# Patient Record
Sex: MALE | Race: WHITE | NOT HISPANIC OR LATINO | Employment: OTHER | URBAN - METROPOLITAN AREA
[De-identification: names, ages, dates, MRNs, and addresses within clinical notes are randomized per-mention and may not be internally consistent; named-entity substitution may affect disease eponyms.]

---

## 2020-03-16 ENCOUNTER — TELEPHONE (OUTPATIENT)
Dept: CARDIAC SURGERY | Facility: CLINIC | Age: 60
End: 2020-03-16

## 2020-03-16 NOTE — TELEPHONE ENCOUNTER
Called and spoke with patient concerning appointment 3/17/20. We have cancelled the appointment and will call him to reschedule. Dr Rodriguez has reviewed his recent testing report and feels this is acceptable. Patient is in agreement and I let him know we will call him in several weeks to reschedule

## 2020-04-22 ENCOUNTER — TELEPHONE (OUTPATIENT)
Dept: CARDIAC SURGERY | Facility: CLINIC | Age: 60
End: 2020-04-22

## 2020-04-22 NOTE — TELEPHONE ENCOUNTER
Spoke with patient letting him know the test he had completed does not provide imaging our providers can view. This was determined when I spoke with [facility] testing. Patient will discuss follow up options with Montserrat MCGEE at tomorrows video visit.

## 2020-04-23 ENCOUNTER — TELEPHONE (OUTPATIENT)
Dept: CARDIAC SURGERY | Facility: CLINIC | Age: 60
End: 2020-04-23

## 2020-04-23 ENCOUNTER — TELEMEDICINE (OUTPATIENT)
Dept: CARDIAC SURGERY | Facility: CLINIC | Age: 60
End: 2020-04-23

## 2020-04-23 VITALS
HEART RATE: 105 BPM | BODY MASS INDEX: 25.91 KG/M2 | HEIGHT: 68 IN | DIASTOLIC BLOOD PRESSURE: 84 MMHG | SYSTOLIC BLOOD PRESSURE: 122 MMHG | OXYGEN SATURATION: 98 % | TEMPERATURE: 97 F | WEIGHT: 171 LBS

## 2020-04-23 DIAGNOSIS — I77.810 THORACIC AORTIC ECTASIA (HCC): Primary | ICD-10-CM

## 2020-04-23 DIAGNOSIS — I10 ESSENTIAL HYPERTENSION: ICD-10-CM

## 2020-04-23 DIAGNOSIS — E78.2 MIXED HYPERLIPIDEMIA: ICD-10-CM

## 2020-04-23 DIAGNOSIS — Z72.0 TOBACCO ABUSE: ICD-10-CM

## 2020-04-23 PROBLEM — E11.9 DM TYPE 2 (DIABETES MELLITUS, TYPE 2): Status: ACTIVE | Noted: 2020-04-23

## 2020-04-23 PROBLEM — J44.9 COPD (CHRONIC OBSTRUCTIVE PULMONARY DISEASE): Status: ACTIVE | Noted: 2020-04-23

## 2020-04-23 PROCEDURE — 99215 OFFICE O/P EST HI 40 MIN: CPT | Performed by: NURSE PRACTITIONER

## 2020-04-23 RX ORDER — GABAPENTIN 100 MG/1
100 CAPSULE ORAL 2 TIMES DAILY
COMMUNITY

## 2020-04-23 RX ORDER — CYCLOBENZAPRINE HCL 5 MG
5 TABLET ORAL 3 TIMES DAILY PRN
COMMUNITY

## 2020-04-23 NOTE — TELEPHONE ENCOUNTER
After speaking with Montserrat MCGEE I called and made a new patient appointment for patient with Dr Short. 6/30/20 at 11am. This was agreeable to the patient. He has also been placed on the recall list for 6 months to follow up in our office with a repeat CTof the chest without contrast

## 2020-04-23 NOTE — PROGRESS NOTES
4/23/2020      Subjective:      Ino Maddox MD    Chief Complaint:  Ascending aortic ectasia    History of Present Illness:       Dear Ino Andrade MD and Colleagues,    It was nice to talk with Patel Nevarez in [facility] for evaluation and management of ascending aortic ectasia d/t video visit given COVID-19 pandemic precautions.  I was asked by Dr. Ibarra to see him today.  Heis a 59 y.o. male with a complex history of prior cardiac surgery on 2 occasions,  HTN, HLD, fatty liver disease, COPD/emphysema, continued tobacco abuse, fibromyalgia and type 2 diabetes.  He had ASD repair and mitral valve repair in 1972 and reoperative mitral valve surgery in 2016 at [facility] in [place].  He is mailing us the op note .  He is a retired nurse.  He also has a CT report from [place] after his cardiac surgery that reports his ascending aorta measurement at 3.75 cm.  The low dose screening CT of chest  images on 2/24/2020 were unable to be reviewed.  [facility] reports these images are not reproducible. This was d/w Dr. Ibarra and he said to proceed with the appt and evaluate what Mr. Nevarez needs.  The CT reports that his ascending aorta measures 4.3 cm.  He reports daily shortness of breath that worsens with exertion.  He is not on oxygen and attributes his SOA to emphysema.  He does use daily nebulizers at home.  He reports chest a couple times a month.  It is intermittent and is relieved with one SL NTG.  He has chronic low back pain.  He denies any numbness/tingling/pain of extremities.  No vascular deficiencies or hyperextensible or hypermobile joints are noted on exam.  The patient's family history is positive for aneurysms--his father has an abd aortic aneurysm that has not required surgical intervention.  Negative hx of familial aortic dissections or connective tissue disease, and positive for coronary disease in first degree family members--father had CABG at age 52.  He does report  "he is always tachycardic despite being on metoprolol.  He takes his medications routinely.  He has had a recent weight loss of 40 pounds since starting semaglutide.  His partner also reports \"marked lack of energy in the last year.\"  He also validated his SOA/CP.  His BP today is 122/84.  He reports 1/2 pack a day tobacco use currently but has been smoking since age 20.        Patient Active Problem List   Diagnosis   • Thoracic aortic ectasia (CMS/HCC)   • Essential hypertension   • DM type 2 (diabetes mellitus, type 2) (CMS/HCC)   • Mixed hyperlipidemia   • COPD (chronic obstructive pulmonary disease) (CMS/HCC)   • Tobacco abuse       Past Medical History:   Diagnosis Date   • Aneurysm (CMS/HCC)    • Anxiety    • COPD (chronic obstructive pulmonary disease) (CMS/HCC)    • DDD (degenerative disc disease), cervical    • Diabetes mellitus (CMS/HCC)    • Emphysema of lung (CMS/HCC)    • Fibromyalgia    • GERD (gastroesophageal reflux disease)    • Hyperlipidemia    • Hypertension    • Migraine    • TIA (transient ischemic attack)        Past Surgical History:   Procedure Laterality Date   • APPENDECTOMY     • ASD REPAIR, OSTIUM PRIMUM     • ELBOW PROCEDURE     • MITRAL VALVE REPAIR/REPLACEMENT  1972,  2016   • TONSILLECTOMY         Allergies   Allergen Reactions   • Tigan [Trimethobenzamide Hcl] Rash         Current Outpatient Medications:   •  albuterol sulfate  (90 Base) MCG/ACT inhaler, Inhale 2 puffs Every 4 (Four) Hours As Needed for Wheezing., Disp: , Rfl:   •  aspirin 81 MG EC tablet, Take 325 mg by mouth 2 (Two) Times a Day., Disp: , Rfl:   •  atorvastatin (LIPITOR) 40 MG tablet, Take 40 mg by mouth Daily., Disp: , Rfl:   •  clonazePAM (KlonoPIN) 0.5 MG tablet, Take 0.5 mg by mouth 2 (Two) Times a Day As Needed for Seizures., Disp: , Rfl:   •  clopidogrel (PLAVIX) 75 MG tablet, Take 75 mg by mouth Daily., Disp: , Rfl:   •  cyclobenzaprine (FLEXERIL) 5 MG tablet, Take 5 mg by mouth 3 (Three) Times a Day " As Needed for Muscle Spasms., Disp: , Rfl:   •  divalproex (DEPAKOTE) 500 MG DR tablet, Take 1,000 mg by mouth 2 (Two) Times a Day., Disp: , Rfl:   •  famotidine (PEPCID) 20 MG tablet, Take 20 mg by mouth 2 (Two) Times a Day., Disp: , Rfl:   •  gabapentin (NEURONTIN) 100 MG capsule, Take 100 mg by mouth 2 (Two) Times a Day., Disp: , Rfl:   •  gemfibrozil (LOPID) 600 MG tablet, Take 600 mg by mouth 2 (Two) Times a Day Before Meals., Disp: , Rfl:   •  Ipratropium-Albuterol (ALBUTEROL-IPRATROPIUM IN), Inhale., Disp: , Rfl:   •  metFORMIN ER (GLUCOPHAGE-XR) 750 MG 24 hr tablet, Take 15,000 mg by mouth Daily With Breakfast & Dinner., Disp: , Rfl:   •  metoprolol tartrate (LOPRESSOR) 50 MG tablet, Take 50 mg by mouth 2 (Two) Times a Day., Disp: , Rfl:   •  oxyCODONE-acetaminophen (PERCOCET) 5-325 MG per tablet, Take 1 tablet by mouth Every 6 (Six) Hours As Needed., Disp: , Rfl:   •  QUEtiapine XR (SEROquel XR) 200 MG 24 hr tablet, Take 200 mg by mouth Every Night., Disp: , Rfl:   •  SEMAGLUTIDE,0.25 OR 0.5MG/DOS, SC, Inject 0.5 mg under the skin into the appropriate area as directed 1 (One) Time Per Week., Disp: , Rfl:   •  venlafaxine (EFFEXOR) 75 MG tablet, Take 75 mg by mouth 2 (Two) Times a Day., Disp: , Rfl:   •  methocarbamol (ROBAXIN) 500 MG tablet, Take 500 mg by mouth 4 (Four) Times a Day., Disp: , Rfl:     Social History     Socioeconomic History   • Marital status:      Spouse name: Not on file   • Number of children: Not on file   • Years of education: Not on file   • Highest education level: Not on file   Tobacco Use   • Smoking status: Current Every Day Smoker     Packs/day: 0.50     Years: 22.00     Pack years: 11.00   • Smokeless tobacco: Never Used   Substance and Sexual Activity   • Alcohol use: Not Currently   • Drug use: Never       Family History   Problem Relation Age of Onset   • Diabetes Mother    • Hyperlipidemia Mother    • Cancer Mother    • Hypertension Mother    • Thyroid disease Mother     • Hypertension Father    • Diabetes Father    • Heart attack Father    • Aneurysm Father    • Cancer Father            Review of Systems:  Review of Systems   Constitutional: Positive for fatigue. Negative for fever.        Recent loss of 40 pounds--intended   HENT: Negative for postnasal drip and rhinorrhea.    Respiratory: Positive for cough, shortness of breath and wheezing.         +snoring   Cardiovascular: Positive for chest pain. Negative for palpitations and leg swelling.   Gastrointestinal: Negative for abdominal pain, constipation, diarrhea, nausea and vomiting.   Musculoskeletal: Positive for back pain.   Skin: Negative for rash and wound.   Allergic/Immunologic: Negative for immunocompromised state.   Neurological: Positive for light-headedness. Negative for dizziness, seizures, syncope, speech difficulty, weakness and numbness.   Hematological: Bruises/bleeds easily.   Psychiatric/Behavioral: The patient is nervous/anxious.    All other systems reviewed and are negative.      Physical Exam:    Vital Signs:  Weight: 77.6 kg (171 lb)   Body mass index is 26 kg/m².  Temp: 97 °F (36.1 °C)   Heart Rate: 105   BP: 122/84     Physical Exam   Constitutional: He appears well-developed and well-nourished. He is active and cooperative. No distress.   HENT:   Head: Normocephalic and atraumatic.   Eyes: Pupils are equal, round, and reactive to light. EOM and lids are normal. Right eye exhibits no discharge. Left eye exhibits no discharge. No scleral icterus.   Wearing glasses   Cardiovascular: Tachycardia present.   Pulmonary/Chest: Effort normal. No respiratory distress.   On room air, no dyspnea noted with conversation   Neurological: He is alert. GCS eye subscore is 4. GCS verbal subscore is 5. GCS motor subscore is 6.   Skin: Skin is dry and intact.   Psychiatric: He has a normal mood and affect. His speech is normal and behavior is normal. Judgment and thought content normal. Cognition and memory are normal.    Nursing note and vitals reviewed.       Assessment:     1.  Ascending aortic ectasia, presumed per report--4.3 cm  2.  S/P cardiac surgery--s/p ASD/ mitral valve repair (1972) and reop MVR (2016)  3.  HTN--stable  4.  HLD--statin  5.  Continued tobacco abuse--cessation d/w pt, he is not ready to quit  6.  Emphysema/COPD d/t #5  7.  DM type 2 with hyperglycemia--considerable weight loss with new medicine regimen  8.  HAL--current with psychiatry    Recommendation/Plan:       Continued risk factor modification of hypertension with a goal blood pressure less than 130/80, hyperlipidemia optimization, and continued avoidance of tobacco/nicotine products.    We discussed the importance of avoidance of over the counter decongestants and stimulants, specifically pseudoephedrine, for hypertension and aneurysm management.    Initiation of low aerobic exercise is indicated to help reduce body habitus, assist with blood pressure and cholesterol control.       Although risk of rupture is low, emergency department presentation is warranted for acute chest, back, or abdominal pain, syncope, or stroke like symptoms.    Follow up in [facility] in 6 months with CT scan of chest without contrast if his insurance will allow at [facility].  He would also benefit from a cardiology consult--he has not been seen in >5 years.  This was definitely an unusual circumstance that we were not able to see images from a prior CT scan d/t not being reproducible.  Dr. Ibarra was involved in the direction to take with this pt.  Dr. Ibarra recommended cardiology referral to Dr. Short--our office will make the referral.    This patient has consented to a telehealth visit via [employer]. The visit was scheduled as a video visit to comply with patient safety concerns in accordance with CDC recommendations.  All vitals recorded within this visit are reported by the patient.  I spent  50 minutes in total including but not limited to the 26 minutes spent in  direct conversation with this patient.     Thank you for allowing us to participate in his care.    Regards,    Montserrat Serra, ARELY      **All problems and history are new to this examiner.  Extensive review of records prior to and during patient visit

## 2020-06-30 ENCOUNTER — OFFICE VISIT CONVERTED (OUTPATIENT)
Dept: CARDIOLOGY | Facility: CLINIC | Age: 60
End: 2020-06-30
Attending: INTERNAL MEDICINE

## 2020-07-14 ENCOUNTER — OFFICE VISIT CONVERTED (OUTPATIENT)
Dept: CARDIOLOGY | Facility: CLINIC | Age: 60
End: 2020-07-14
Attending: INTERNAL MEDICINE

## 2020-09-04 ENCOUNTER — HOSPITAL ENCOUNTER (OUTPATIENT)
Dept: GENERAL RADIOLOGY | Facility: HOSPITAL | Age: 60
Discharge: HOME | End: 2020-09-04
Attending: INTERNAL MEDICINE

## 2020-09-10 ENCOUNTER — HOSPITAL ENCOUNTER (OUTPATIENT)
Dept: PREADMISSION TESTING | Facility: HOSPITAL | Age: 60
Discharge: HOME | End: 2020-09-10
Attending: INTERNAL MEDICINE

## 2020-09-12 LAB — SARS-COV-2 RNA SPEC QL NAA+PROBE: NOT DETECTED

## 2020-09-15 ENCOUNTER — HOSPITAL ENCOUNTER (OUTPATIENT)
Dept: GASTROENTEROLOGY | Facility: HOSPITAL | Age: 60
Setting detail: HOSPITAL OUTPATIENT SURGERY
Discharge: HOME | End: 2020-09-15
Attending: INTERNAL MEDICINE

## 2020-09-15 LAB — GLUCOSE BLD-MCNC: 101 MG/DL (ref 70–99)

## 2020-12-29 ENCOUNTER — TELEPHONE (OUTPATIENT)
Dept: CARDIAC SURGERY | Facility: CLINIC | Age: 60
End: 2020-12-29

## 2020-12-29 NOTE — TELEPHONE ENCOUNTER
I CALLED PT REGARDING HIS 6 MONTH RECALL TO SEE ANAY LOMELI. PT STATES HE IS SEEING HIS CARDIOLOGIST DR CHAPPELL IN JAN 2021 AND SAID THAT HIS ANEURYSM HAS NOT GROWN. HE SAID HE HAD CT A FEW MONTHS AGO. PT DID NOT WISH TO SCHEDULE F/U APPT W/CARDIAC SURGERY OFFICE AT THIS TIME. PT SAID HE WILL SEE CARDIOLOGIST AND WILL CALL BACK IF HE DECIDES TO SCHEDULE.

## 2021-01-18 ENCOUNTER — OFFICE VISIT CONVERTED (OUTPATIENT)
Dept: CARDIOLOGY | Facility: CLINIC | Age: 61
End: 2021-01-18
Attending: INTERNAL MEDICINE

## 2021-05-10 NOTE — H&P
History and Physical      Patient Name: Patel Nevarez   Patient ID: [id number]   Sex: Male   YOB: 1960    Primary Care Provider: Ino Maddox MD   Referring Provider: Ino Maddox MD    Visit Date: June 30, 2020    Provider: Satnam Short MD   Location: [facility]   Location Address: [address] [place] [address], [facility]   [place], [place]  [id number]   Location Phone: [phone]          Chief Complaint     Reason for Consultation: Establish cardiac care, status post mitral valve repair, ascending aortic aneurysm.       History Of Present Illness  Consult requested by: Ino Maddox MD   Patel Nevarez is a 60 year old male with a congenital heart disease who underwent mitral valve repair and closure of ASD in 1972 and a redo mitral valve repair in 2016. Other medical problems include recurrent TIAs, COPD, diastolic heart failure, impulse control disorder, and hyperlipidemia. Patient recently moved to our area. His last cardiac surgery was done in [facility] in [place]. He has not seen a cardiologist in 4 years. A CT scan of the chest was ordered by Dr. Ino Maddox recently for screening for lung cancer, and he was incidentally noted to have a dilated ascending aorta measuring up 4.3 cm in diameter. He underwent a telehealth visit with cardiothoracic surgeons at [facility], who recommended a repeat CT scan in 6 months' time. Today, patient is accompanied by his partner and both of them report significant shortness of breath on activities. Denies having any chest pain or tightness. He feels weak and tired most of the time. No recent weight gain. No major pedal edema. No recent medication changes.   PAST MEDICAL HISTORY: 1) Congenital heart disease status post mitral valve repair and closure of atrial septal defect in 1972; 2) Severe mitral regurgitation status post redo mitral valve repair with annuloplasty ring placement in 2016 at [facility] in  [place]; 3) Recurrent TIAs, on long-term Plavix; 4) Impulse control disorder; 5) Hypertension; 6) Hyperlipidemia; 7) Diabetes mellitus; 8) Chronic obstructive pulmonary disease; 9) Recently detected ascending aortic aneurysm, measuring 4.3 cm in diameter.   PSYCHOSOCIAL HISTORY: Patient is a smoker, who smokes half-a-pack a cigarettes per day. Denies any alcohol use. No recreational drug usage. Admits mood changes and depression.   FAMILY HISTORY: Reviewed. Positive for premature coronary artery disease.   CURRENT MEDICATIONS: Plavix 75 mg daily; metoprolol 50 mg b.i.d.; cyclobenzaprine 10 mg t.i.d. p.r.n.; clonazepam 0.5 mg b.i.d. p.r.n.; Seroquel 75 mg b.i.d. and Seroquel 200 mg q. h.s.; oxcarbazepine 300 mg b.i.d.; atorvastatin 40 mg daily; divalproex 500 mg b.i.d.; amitriptyline 100 mg daily; famotidine 20 mg b.i.d.; quetiapine 200 mg daily; metformin 750 mg 3 tablets daily; Ozempic q. week; Dosage and frequency of the medication(s) reviewed with the patient.   ALLERGIES: Tigan.       Review of Systems  · Constitutional  o Admits  o : fatigue, good general health lately, recent weight changes   · Eyes  o Denies  o : blurred vision  · HENT  o Admits  o : hearing loss or ringing  o Denies  o : chronic sinus problem, swollen glands in neck  · Cardiovascular  o Admits  o : chest pain, palpitations (fast, fluttering, or skipping beats), shortness of breath with activities   o Denies  o : swelling (feet, ankles, hands)  · Respiratory  o Admits  o : asthma or wheezing, COPD  o Denies  o : chronic or frequent cough  · Gastrointestinal  o Denies  o : ulcers, nausea or vomiting  · Neurologic  o Admits  o : lightheaded or dizzy, stroke, headaches  · Musculoskeletal  o Admits  o : joint pain, back pain  · Endocrine  o Admits  o : diabetes  o Denies  o : thyroid disease, heat or cold intolerance, excessive thirst or urination  · Heme-Lymph  o Admits  o : bleeding or bruising tendency  o Denies  o : anemia      Vitals  Date  "Time BP Position Site L\R Cuff Size HR RR TEMP (F) WT  HT  BMI kg/m2 BSA m2 O2 Sat        06/30/2020 11:34 /90 Sitting    100 - R   164lbs 0oz 5'  8\" 24.94 1.89           Physical Examination  · Constitutional  o Appearance  o : Awake, alert, in no acute distress.  · Head and Face  o HEENT  o : No pallor, anicteric. Eyes normal. Moist mucous membranes.  · Neck  o Inspection/Palpation  o : Supple. No hepatosplenomegaly.  o Jugular Veins  o : No JVD. No carotid bruits.  · Respiratory  o Auscultation of Lungs  o : Clear to auscultation bilaterally. No crackles or wheezing.  · Cardiovascular  o Heart  o : S1, S2 normally heard. No S3. No murmur, rubs, or gallops.  · Gastrointestinal  o Abdominal Examination  o : Soft, non-distended. No palpable hepatosplenomegaly. Bowel sounds heard in all four quadrants.  · Musculoskeletal  o General  o : Normal muscle tone and strength.  · Skin and Subcutaneous Tissue  o General Inspection  o : No skin rashes.  · Extremities  o Extremities  o : Trace pitting pedal edema, bilaterally. Distal pulses present.   · Imaging  o Imaging  o : CT scan of the chest on 02/24/2020 showed fusiform aneurysmal dilatation of the ascending aorta measuring 4.3 cm in diameter.   · EKG  o EKG  o : Performed in the office today.  o Indications  o : Shortness of breath.  o Results  o : Normal sinus rhythm. Borderline intraventricular conduction delay. Nonspecific T-wave abnormalities in the lateral leads. Abnormal EKG.  o Comparison  o : No previous EKG available for comparison.           Assessment     ASSESSMENT & PLAN:    1.  Valvular heart disease status post mitral valve repair done twice in the past.  His major problem at this time        is shortness of breath, low endurance, and easy fatigability.  Mild pedal edema present.  Will start the        cardiac workup with an echocardiogram to reassess the LV function and the status of the mitral valve.  If        there is no significant mitral " regurgitation, he will benefit from a stress test to rule out ischemia.    2.  Ascending aortic aneurysm, measuring 4.3 cm per CT scan in February.  The size was 3.75 cm in 2016 per        CT scan of the chest.  Recommended to have a repeat CT scan in 6 months, which will fall in August of this        year.  He already has a followup appointment with Dr. Ibarra.  3.  Hyperlipidemia.  Continue atorvastatin.  4.  Previous stroke/TIA.  No deficits.  On long-term Plavix.  5.  Follow up with echo report.        MD QUYEN Pedro:vm             Electronically Signed by: Tameka Min-, Other -Author on July 6, 2020 12:01:24 PM  Electronically Co-signed by: Satnam Short MD -Reviewer on July 8, 2020 08:39:21 AM

## 2021-05-10 NOTE — PROCEDURES
"   Procedure Note      Patient Name: Patel Nevarez   Patient ID: [id number]   Sex: Male   YOB: 1960    Primary Care Provider: Ino Maddox MD   Referring Provider: Ino Maddox MD    Visit Date: July 14, 2020    Provider: Satnam Short MD   Location: [facility]   Location Address: [address] [place] [address], [address]   [place]  [id number]   Location Phone: [phone]          FINAL REPORT   TRANSTHORACIC ECHOCARDIOGRAM REPORT    Diagnosis: Chest pain, shortness of breath, status post mitral valve repair.   Height: 5'8\" Weight: 164 B/P: 120/90 BSA: 1.88   Tech: JLW   MEASUREMENTS:  RVID (Diastole) : RVID. (NORMAL: 0.7 to 2.4 cm max)   LVID (Systole): 4.2 cm (Diastole): 5 cm . (NORMAL: 3.7 - 5.4 cm)   Posterior Wall Thickness (Diastole): 1 cm. (NORMAL: 0.8 - 1.1 cm)   Septal Thickness (Diastole): 1.1 cm. (NORMAL: 0.7 - 1.2 cm)   LAID (Systole): 4 cm. (NORMAL: 1.9 - 3.8 cm)   Aortic Root Diameter (Diastole): 3.7 cm. (NORMAL: 2.0 - 3.7 cm)   COMMENTS:  The patient underwent 2-D, M-Mode, and Doppler examination, including pulse-wave, continuous-wave, and color-flow Doppler analysis; the study is technically adequate. The following findings were noted:   FINDINGS:  MITRAL VALVE: Mitral annuloplasty ring and postsurgical changes noted. Trace residual mitral regurgitation noted. The patient has increased flow velocities across the mitral valve. The peak mitral inflow velocity is 260 cm/sec with a max gradient of 27 mmHg and a mean gradient of 8 mmHg. These findings are consistent with moderate stenosis of the valve.   AORTIC VALVE: Normal in appearance, opens well. No evidence of aortic stenosis. There is mild aortic insufficiency.   TRICUSPID VALVE: Normal in appearance, opens well. There is mild tricuspid regurgitation. Unable to calculate pulmonary artery systolic pressure due to incomplete TR Doppler envelope.   PULMONIC VALVE: Grossly normal.   AORTIC ROOT: There is mild " dilation of aortic root measuring 3.7 cm in diameter.   LEFT ATRIUM: There is moderate left atrial enlargement. No intracavitary masses or clots seen. LA volume index is 34 mL/m2.   LEFT VENTRICLE: The left ventricular chamber size is normal. The left ventricular wall thickness is normal. There is paradoxical septal motion consistent with postoperative state. There is mild global hypokinesis of the left ventricle. Overall LV ejection fraction is 45%. There is diastolic dysfunction of the left ventricle.   RIGHT VENTRICLE: Normal size and function.   RIGHT ATRIUM: Mild right atrial enlargement.   PERICARDIUM: No evidence of pericardial effusion.   INFERIOR VENA CAVA: Measures 1.3 cm in diameter and there is more than 50% collapse during inspiration.   DOPPLER: DT= 193 msec. IVRT is 85 msec. E/E' is 31.   Faxed: 07/15/2020      CONCLUSION:  1.   Mild global hypokinesis of the left ventricle with estimated LV ejection fraction of 45% with paradoxical         septal motion.   2.   Diastolic dysfunction of the left ventricle.   3.   Postsurgical changes noted of mitral valve with no significant residual mitral regurgitation.    4.   Moderate mitral stenosis noted.       MD PARVEZ Mclean/farheen    This note was transcribed by Lyn Dougherty.  farheen/parvez  The above service was transcribed by Lyn Dougherty, and I attest to the accuracy of the note.  PARVEZ                 Electronically Signed by: Iliana Dougherty-, Other -Author on July 15, 2020 12:13:05 PM  Electronically Co-signed by: Satnam Short MD -Reviewer on July 15, 2020 01:17:25 PM

## 2021-05-14 VITALS
DIASTOLIC BLOOD PRESSURE: 100 MMHG | HEIGHT: 68 IN | SYSTOLIC BLOOD PRESSURE: 148 MMHG | BODY MASS INDEX: 29.25 KG/M2 | HEART RATE: 102 BPM | WEIGHT: 193 LBS

## 2021-05-14 NOTE — PROGRESS NOTES
Progress Note      Patient Name: Patel Nevarez   Patient ID: [id number]   Sex: Male   YOB: 1960    Primary Care Provider: Ino Maddox MD   Referring Provider: Ino Maddox MD    Visit Date: January 18, 2021    Provider: Satnam Short MD   [place] [facility]   Location Address: [address] [place] [address], [facility]   [place], [place]  [id number]   Location Phone: [phone]          Chief Complaint     Follow-up visit, ascending aortic aneurysm, status post mitral valve repair, congenital heart disease.       History Of Present Illness  REFERRING CARE PROVIDER: Ino Maddox MD   Patel Nevarez is a 60 year old male with congenital heart disease, status post surgery as a child, re-do mitral valve repair in 2016, who is here for follow-up visit. The patient also has other multiple medical problems including TIA, COPD, diastolic heart failure, impulse control disorder and hyperlipidemia. He was previously seen on 06/30/2020 when he came to establish cardiac care. Since then, an echocardiogram was performed which showed normal LV function and moderate stenosis of the mitral valve. Lasix was added to the regimen at that time. Today, the patient reports feeling better, still has some chest pain and tightness along with shortness of breath. No palpitations. No dizziness. No syncopal episodes. The patient is accompanied by his significant other, and both of them reported the patient is going through a lot of stress in his life. Recently, he underwent repeat CT scan of the chest.   PAST MEDICAL HISTORY: 1) Congenital heart disease status post mitral valve repair and closure of atrial septal defect in 1972; 2) Severe mitral regurgitation status post redo mitral valve repair with annuloplasty ring placement in 2016 at [facility] in [place]; 3) Recurrent TIAs, on long-term Plavix; 4) Impulse control disorder; 5) Hypertension; 6) Hyperlipidemia; 7) Diabetes mellitus; 8) Chronic obstructive  "pulmonary disease; 9) Recently detected ascending aortic aneurysm, measuring 4.3 cm in diameter.   PSYCHOSOCIAL HISTORY: Denies use of alcohol. He currently uses tobacco, a half pack per day.   CURRENT MEDICATIONS: Plavix 75 mg daily; Metoprolol 50 mg twice daily; Cyclobenzaprine 10 mg three times daily; Seroquel 75 mg twice daily and Seroquel 200 mg at bedtime; Oxcarbazepine 300 mg twice daily; Atorvastatin 40 mg daily; Divalproex 500 mg twice daily; Amitriptyline 100 mg at bedtime; Famotidine 20 mg twice daily; Quetiapine 200 mg daily; Metformin 750 three tabs daily; Ozempic 0.5 weekly; Valium 5 mg twice daily as needed; Lasix 20 mg daily.      ALLERGIES:  Compazine.       Review of Systems  · Cardiovascular  o Admits  o : shortness of breath while walking or lying flat, chest pain or angina pectoris   o Denies  o : palpitations (fast, fluttering, or skipping beats), swelling (feet, ankles, hands)  · Respiratory  o Denies  o : chronic or frequent cough.      Vitals  Date Time BP Position Site L\R Cuff Size HR RR TEMP (F) WT  HT  BMI kg/m2 BSA m2 O2 Sat FR L/min FiO2 HC       01/18/2021 10:59 /100 Sitting    102 - R   193lbs 0oz 5'  8\" 29.35 2.05       01/18/2021 10:59 /96 Sitting    100 - R                   Physical Examination  · Respiratory  o Auscultation of Lungs  o : Clear to auscultation bilaterally. No crackles or rhonchi.  · Cardiovascular  o Heart  o : Regular rate and rhythm. There is a 2/6 diastolic murmur heard at the apex. No S3.   · Gastrointestinal  o Abdominal Examination  o : Soft, nontender, nondistended. No free fluid. Bowel sounds heard in all four quadrants.  · Extremities  o Extremities  o : Warm and well perfused. No pitting pedal edema. Distal pulses present.  · Echocardiogram  o Echocardiogram  o : Most recent echocardiogram again showed moderate stenosis of the bioprosthetic valve. Will need close monitoring and repeat echocardiogram in six months.   · Diagnostic " Testing  o Diagnostic Testing  o : CT scan of the chest without contrast done on 09/04/2020 showed ascending aortic dimension of 4 cm.           Assessment     ASSESSMENT AND PLAN:  1. Ascending aortic aneurysm 4 cm in diameter, no significant change from previous ones.  Next CT scan in one year.    2. Congenital heart disease, status post mitral valve repair twice in the past.  Most recent echocardiogram again showed moderate stenosis of the bioprosthetic valve.  Will need close monitoring and repeat echocardiogram in six months.  Lasix was started during last visit due to mild volume overload, which will be continued.  Will continue Metoprolol as well.    3. Previous stroke/TIA.  No residual deficit.  On long-term Plavix to continue.  4. Follow-up in six months with echocardiogram report.                 Electronically Signed by: Vicky Goncalves-, Other -Author on January 26, 2021 02:26:10 PM  Electronically Co-signed by: Satnam Short MD -Reviewer on January 26, 2021 03:52:23 PM

## 2021-05-15 VITALS
BODY MASS INDEX: 24.86 KG/M2 | HEART RATE: 100 BPM | DIASTOLIC BLOOD PRESSURE: 90 MMHG | WEIGHT: 164 LBS | HEIGHT: 68 IN | SYSTOLIC BLOOD PRESSURE: 120 MMHG

## 2021-05-23 ENCOUNTER — TRANSCRIBE ORDERS (OUTPATIENT)
Dept: CARDIOLOGY | Facility: CLINIC | Age: 61
End: 2021-05-23

## 2021-05-23 DIAGNOSIS — R06.02 SHORTNESS OF BREATH: ICD-10-CM

## 2021-05-23 DIAGNOSIS — Z98.890 S/P MITRAL VALVE REPAIR: Primary | ICD-10-CM

## 2021-06-15 ENCOUNTER — TRANSCRIBE ORDERS (OUTPATIENT)
Dept: ADMINISTRATIVE | Facility: HOSPITAL | Age: 61
End: 2021-06-15

## 2021-06-15 DIAGNOSIS — J44.9 CHRONIC OBSTRUCTIVE PULMONARY DISEASE, UNSPECIFIED COPD TYPE (HCC): Primary | ICD-10-CM

## 2021-06-15 DIAGNOSIS — R06.00 DYSPNEA, UNSPECIFIED TYPE: ICD-10-CM

## 2021-06-21 ENCOUNTER — HOSPITAL ENCOUNTER (OUTPATIENT)
Dept: RESPIRATORY THERAPY | Facility: HOSPITAL | Age: 61
Discharge: HOME | End: 2021-06-21
Admitting: INTERNAL MEDICINE

## 2021-06-21 DIAGNOSIS — R06.00 DYSPNEA, UNSPECIFIED TYPE: ICD-10-CM

## 2021-06-21 DIAGNOSIS — J44.9 CHRONIC OBSTRUCTIVE PULMONARY DISEASE, UNSPECIFIED COPD TYPE (HCC): ICD-10-CM

## 2021-06-21 PROCEDURE — 94010 BREATHING CAPACITY TEST: CPT

## 2021-06-21 PROCEDURE — 94010 BREATHING CAPACITY TEST: CPT | Performed by: INTERNAL MEDICINE

## 2021-08-23 ENCOUNTER — OFFICE VISIT (OUTPATIENT)
Dept: CARDIOLOGY | Facility: CLINIC | Age: 61
End: 2021-08-23

## 2021-08-23 VITALS
DIASTOLIC BLOOD PRESSURE: 96 MMHG | BODY MASS INDEX: 29.55 KG/M2 | HEIGHT: 68 IN | HEART RATE: 107 BPM | SYSTOLIC BLOOD PRESSURE: 129 MMHG | WEIGHT: 195 LBS

## 2021-08-23 DIAGNOSIS — I71.21 ASCENDING AORTIC ANEURYSM (HCC): ICD-10-CM

## 2021-08-23 DIAGNOSIS — E78.2 MIXED HYPERLIPIDEMIA: ICD-10-CM

## 2021-08-23 DIAGNOSIS — I50.42 CHRONIC COMBINED SYSTOLIC (CONGESTIVE) AND DIASTOLIC (CONGESTIVE) HEART FAILURE (HCC): Primary | ICD-10-CM

## 2021-08-23 DIAGNOSIS — Z98.890 S/P MITRAL VALVE REPAIR: ICD-10-CM

## 2021-08-23 DIAGNOSIS — R06.02 SHORTNESS OF BREATH: ICD-10-CM

## 2021-08-23 PROCEDURE — 99214 OFFICE O/P EST MOD 30 MIN: CPT | Performed by: INTERNAL MEDICINE

## 2021-08-23 RX ORDER — LOSARTAN POTASSIUM 25 MG/1
25 TABLET ORAL DAILY
Qty: 90 TABLET | Refills: 2 | Status: SHIPPED | OUTPATIENT
Start: 2021-08-23 | End: 2022-02-14

## 2021-08-24 NOTE — PROGRESS NOTES
CARDIOLOGY FOLLOW-UP PROGRESS NOTE        Chief Complaint  Congestive Heart Failure, congenital heart disease, status post mitral valve repair, cardiomyopathy    Subjective            Patel Nevarez presents to [facility] CARDIOLOGY  History of Present Illness    This is a 61-year-old male with congenital heart disease status post surgery as a child, redo mitral valve repair in 2016, cardiomyopathy, congestive heart failure who is here for a follow-up visit.  He also has multiple other medical runs including recurrent TIAs on long-term Plavix, COPD, hyperlipidemia and impulse control disorder.  Today patient reports some shortness of breath and cough and was recently put on new inhalers.  He denies having any chest pain or tightness.  There is no pedal edema.  Currently denies palpitations.  Overall he feels weak and tired most of the time which is not new.  No recent hospitalizations or ER visits.  He is taking all the medications as prescribed including Lasix.  Today, patient underwent echocardiogram in the office.       Past History:     1) Congenital heart disease status post mitral valve repair and closure of atrial septal defect in 1972; 2) Severe mitral regurgitation status post redo mitral valve repair with annuloplasty ring placement in 2016 at [facility] in [place]; 3) Recurrent TIAs, on long-term Plavix; 4) Impulse control disorder; 5) Hypertension; 6) Hyperlipidemia; 7) Diabetes mellitus; 8) Chronic obstructive pulmonary disease; 9) Recently detected ascending aortic aneurysm, measuring 4.3 cm in diameter.     Medical History: has a past medical history of Aneurysm (CMS/HCC), Anxiety, COPD (chronic obstructive pulmonary disease) (CMS/[facility]), DDD (degenerative disc disease), cervical, Diabetes mellitus (CMS/HCC), Emphysema of lung (CMS/HCC), Fibromyalgia, GERD (gastroesophageal reflux disease), Heart attack (CMS/HCC), Hyperlipidemia, Hypertension, Migraine, Prolapse of mitral  valve, TIA (transient ischemic attack), and Type 2 diabetes mellitus (CMS/HCC).     Surgical History: has a past surgical history that includes Tonsillectomy; Appendectomy; Elbow surgery; ASD repair, ostium primum (1972); Mitral valve repair (1972,  2016); and Cardiac surgery (1972.2016).     Family History: family history includes Aneurysm in his father; Cancer in his father and mother; Diabetes in his father, mother, and another family member; Heart attack in his father; Hyperlipidemia in his father and mother; Hypertension in his father and mother; Thyroid disease in his mother.     Social History: reports that he has been smoking. He has a 5.50 pack-year smoking history. He has never used smokeless tobacco. He reports previous alcohol use. He reports that he does not use drugs.    Allergies: Tigan [trimethobenzamide hcl]    Current Outpatient Medications on File Prior to Visit   Medication Sig   • albuterol sulfate  (90 Base) MCG/ACT inhaler Inhale 2 puffs Every 4 (Four) Hours As Needed for Wheezing.   • aspirin 81 MG EC tablet Take 325 mg by mouth 2 (Two) Times a Day.   • atorvastatin (LIPITOR) 40 MG tablet Take 40 mg by mouth Daily.   • clopidogrel (PLAVIX) 75 MG tablet Take 75 mg by mouth Daily.   • cyclobenzaprine (FLEXERIL) 5 MG tablet Take 5 mg by mouth 3 (Three) Times a Day As Needed for Muscle Spasms.   • divalproex (DEPAKOTE) 500 MG DR tablet Take 1,000 mg by mouth 2 (Two) Times a Day.   • famotidine (PEPCID) 20 MG tablet Take 20 mg by mouth 2 (Two) Times a Day.   • gabapentin (NEURONTIN) 100 MG capsule Take 100 mg by mouth 2 (Two) Times a Day.   • gemfibrozil (LOPID) 600 MG tablet Take 600 mg by mouth 2 (Two) Times a Day Before Meals.   • metFORMIN ER (GLUCOPHAGE-XR) 750 MG 24 hr tablet Take 15,000 mg by mouth Daily With Breakfast & Dinner.   • metoprolol tartrate (LOPRESSOR) 50 MG tablet Take 50 mg by mouth 2 (Two) Times a Day.   • oxyCODONE-acetaminophen (PERCOCET) 5-325 MG per tablet Take 1  "tablet by mouth Every 6 (Six) Hours As Needed.   • QUEtiapine XR (SEROquel XR) 200 MG 24 hr tablet Take 300 mg by mouth Every Night.   • Ipratropium-Albuterol (ALBUTEROL-IPRATROPIUM IN) Inhale.   • SEMAGLUTIDE,0.25 OR 0.5MG/DOS, SC Inject 0.5 mg under the skin into the appropriate area as directed 1 (One) Time Per Week.   • venlafaxine (EFFEXOR) 75 MG tablet Take 75 mg by mouth 3 (Three) Times a Day.     No current facility-administered medications on file prior to visit.          Review of Systems   Constitutional: Positive for fatigue.   Respiratory: Positive for cough and shortness of breath. Negative for wheezing.    Cardiovascular: Negative for chest pain, palpitations and leg swelling.   Gastrointestinal: Negative for nausea and vomiting.   Neurological: Negative for dizziness and syncope.        Objective     /96   Pulse 107   Ht 172.7 cm (68\")   Wt 88.5 kg (195 lb)   BMI 29.65 kg/m²       Physical Exam  General : Alert, awake, no acute distress  CVS : Regular rate and rhythm, 3/6 diastolic murmur heard at apex, no rubs or gallops  Lungs: Bilateral faint wheezing heard, no crackles  Abdomen: Soft, nontender, bowel sounds heard in all 4 quadrants  Extremities: Warm, well-perfused, no pedal edema    Result Review :     The following data was reviewed by: Satnam Short MD on 08/23/2021:               Data reviewed: Cardiology studies     Results for orders placed in visit on 08/23/21    Adult Transthoracic Echo Complete w/ Color, Spectral and Contrast if necessary per protocol    Interpretation Summary  · Estimated left ventricular EF = 30% Left ventricular systolic function is moderately decreased.  · There is diastolic dysfunction of left ventricle.  · Mitral valve ring and postsurgical changes of the mitral valve noted.  · Moderate mitral valve stenosis is present.  · Left atrial volume is moderately increased.  · Mild dilation of the aortic root is present. Mild dilation of the sinuses of " Valsalva is present.  · Estimated right ventricular systolic pressure from tricuspid regurgitation is mildly elevated (35-45 mmHg).               Assessment and Plan        Diagnoses and all orders for this visit:    1. Chronic combined systolic (congestive) and diastolic (congestive) heart failure (CMS/HCC) (Primary)    Today's echocardiogram showed LV ejection fraction of 30%, which is a significant drop from previous levels.  Clinically, patient is stable and not volume overloaded.  He denies having any anginal-like symptoms.  The findings were discussed in detail with the patient.  Will initiate maximal goal-directed medical therapy.  He is already on metoprolol which will be continued.  Will start losartan 25 mg p.o. daily and continue Lasix as it is.  We will also proceed with a SPECT stress test to rule out reversible ischemia as etiology of worsening LV function.    2. S/P mitral valve repair    Today's echocardiogram showed moderate stenosis of the mitral valve which underwent previous repair.  The severity is unchanged from previous echo.  We will monitor for now    3. Mixed hyperlipidemia    On statins.  We will get the latest lab reports from PCP office and adjust the dose of medicines if needed    4.  Ascending aortic aneurysm.    Less than 4 cm diameter per most recent CT scan of the chest.  Will need a repeat CT scan in 2 years.    Other orders  -     losartan (Cozaar) 25 MG tablet; Take 1 tablet by mouth Daily.  Dispense: 90 tablet; Refill: 2  -SPECT stress testing          Follow Up     Return in about 3 months (around 11/23/2021) for Recheck, ok to double book .    Patient was given instructions and counseling regarding his condition or for health maintenance advice. Please see specific information pulled into the AVS if appropriate.

## 2021-09-10 ENCOUNTER — HOSPITAL ENCOUNTER (OUTPATIENT)
Dept: NUCLEAR MEDICINE | Facility: HOSPITAL | Age: 61
Discharge: HOME | End: 2021-09-10

## 2021-09-10 DIAGNOSIS — R06.02 SHORTNESS OF BREATH: ICD-10-CM

## 2021-09-10 DIAGNOSIS — I50.42 CHRONIC COMBINED SYSTOLIC (CONGESTIVE) AND DIASTOLIC (CONGESTIVE) HEART FAILURE (HCC): ICD-10-CM

## 2021-09-10 PROCEDURE — A9502 TC99M TETROFOSMIN: HCPCS | Performed by: INTERNAL MEDICINE

## 2021-09-10 PROCEDURE — 93016 CV STRESS TEST SUPVJ ONLY: CPT | Performed by: NURSE PRACTITIONER

## 2021-09-10 PROCEDURE — 93017 CV STRESS TEST TRACING ONLY: CPT

## 2021-09-10 PROCEDURE — 0 TECHNETIUM TETROFOSMIN KIT: Performed by: INTERNAL MEDICINE

## 2021-09-10 PROCEDURE — 78452 HT MUSCLE IMAGE SPECT MULT: CPT | Performed by: INTERNAL MEDICINE

## 2021-09-10 PROCEDURE — 78452 HT MUSCLE IMAGE SPECT MULT: CPT

## 2021-09-10 PROCEDURE — 25010000002 REGADENOSON 0.4 MG/5ML SOLUTION

## 2021-09-10 PROCEDURE — 93018 CV STRESS TEST I&R ONLY: CPT | Performed by: INTERNAL MEDICINE

## 2021-09-10 RX ADMIN — TETROFOSMIN 1 DOSE: 1.38 INJECTION, POWDER, LYOPHILIZED, FOR SOLUTION INTRAVENOUS at 09:06

## 2021-09-10 RX ADMIN — REGADENOSON 0.4 MG: 0.08 INJECTION, SOLUTION INTRAVENOUS at 09:04

## 2021-09-10 RX ADMIN — TETROFOSMIN 1 DOSE: 1.38 INJECTION, POWDER, LYOPHILIZED, FOR SOLUTION INTRAVENOUS at 07:36

## 2021-09-11 LAB
BH CV IMMEDIATE POST TECH DATA BLOOD PRESSURE: NORMAL MMHG
BH CV IMMEDIATE POST TECH DATA HEART RATE: 102 BPM
BH CV IMMEDIATE POST TECH DATA OXYGEN SATS: 98 %
BH CV REST NUCLEAR ISOTOPE DOSE: 9.6 MCI
BH CV SIX MINUTE RECOVERY TECH DATA BLOOD PRESSURE: NORMAL
BH CV SIX MINUTE RECOVERY TECH DATA HEART RATE: 102 BPM
BH CV SIX MINUTE RECOVERY TECH DATA OXYGEN SATURATION: 98 %
BH CV STRESS COMMENTS STAGE 1: NORMAL
BH CV STRESS DOSE REGADENOSON STAGE 1: 0.4
BH CV STRESS DURATION MIN STAGE 1: 0
BH CV STRESS DURATION SEC STAGE 1: 10
BH CV STRESS HR STAGE 1: 100
BH CV STRESS NUCLEAR ISOTOPE DOSE: 37 MCI
BH CV STRESS O2 STAGE 1: 98
BH CV STRESS PROTOCOL 1: NORMAL
BH CV STRESS RECOVERY BP: NORMAL MMHG
BH CV STRESS RECOVERY HR: 102 BPM
BH CV STRESS RECOVERY O2: 98 %
BH CV STRESS STAGE 1: 1
BH CV THREE MINUTE POST TECH DATA BLOOD PRESSURE: NORMAL MMHG
BH CV THREE MINUTE POST TECH DATA HEART RATE: 102 BPM
BH CV THREE MINUTE POST TECH DATA OXYGEN SATURATION: 98 %
LV EF NUC BP: 19 %
MAXIMAL PREDICTED HEART RATE: 157 BPM
PERCENT MAX PREDICTED HR: 64.97 %
STRESS BASELINE BP: NORMAL MMHG
STRESS BASELINE HR: 100 BPM
STRESS O2 SAT REST: 97 %
STRESS PERCENT HR: 76 %
STRESS POST O2 SAT PEAK: 98 %
STRESS POST PEAK BP: NORMAL MMHG
STRESS POST PEAK HR: 102 BPM
STRESS TARGET HR: 133 BPM

## 2021-09-13 ENCOUNTER — TELEPHONE (OUTPATIENT)
Dept: CARDIOLOGY | Facility: CLINIC | Age: 61
End: 2021-09-13

## 2021-09-13 NOTE — TELEPHONE ENCOUNTER
----- Message from Satnam Short MD sent at 9/11/2021  1:26 PM EDT -----  The study is abnormal.  Heart function is reduced, as noted in echocardiogram.    There is suggestion of an old heart attack affecting the tip of the heart.  This could also be due to previous heart surgeries.    Because of these multiple findings, recommend cardiac catheterization as the next step.  Since he does not have any active chest pain per recent office visit, the procedure can wait until Covid related restrictions to the hospital are lifted.    Call us back for any recurrent or worsening symptoms    Otherwise follow-up as scheduled in November.

## 2021-09-13 NOTE — TELEPHONE ENCOUNTER
S/W patient regarding results of stress test and Dr. Short's recommendations. Patient denies any active chest pain. Advised Dr. Short is recommending a heart cath after the COVID surge. Patient in agreement. Stated that he is vaccinated.    Please put in orders for heart cath.

## 2021-09-15 ENCOUNTER — PREP FOR SURGERY (OUTPATIENT)
Dept: OTHER | Facility: HOSPITAL | Age: 61
End: 2021-09-15

## 2021-10-27 ENCOUNTER — PREP FOR SURGERY (OUTPATIENT)
Dept: OTHER | Facility: HOSPITAL | Age: 61
End: 2021-10-27

## 2021-10-27 DIAGNOSIS — I42.9 CARDIOMYOPATHY, UNSPECIFIED TYPE (HCC): Primary | ICD-10-CM

## 2021-10-28 PROBLEM — I42.9 CARDIOMYOPATHY: Status: ACTIVE | Noted: 2021-10-28

## 2021-11-04 ENCOUNTER — HOSPITAL ENCOUNTER (OUTPATIENT)
Facility: HOSPITAL | Age: 61
Setting detail: HOSPITAL OUTPATIENT SURGERY
Discharge: HOME | End: 2021-11-04
Attending: INTERNAL MEDICINE | Admitting: INTERNAL MEDICINE

## 2021-11-04 VITALS
OXYGEN SATURATION: 94 % | TEMPERATURE: 98 F | SYSTOLIC BLOOD PRESSURE: 134 MMHG | WEIGHT: 195 LBS | RESPIRATION RATE: 16 BRPM | DIASTOLIC BLOOD PRESSURE: 82 MMHG | BODY MASS INDEX: 29.55 KG/M2 | HEART RATE: 106 BPM | HEIGHT: 68 IN

## 2021-11-04 DIAGNOSIS — I42.9 CARDIOMYOPATHY, UNSPECIFIED TYPE (HCC): ICD-10-CM

## 2021-11-04 LAB
ANION GAP SERPL CALCULATED.3IONS-SCNC: 10.9 MMOL/L (ref 5–15)
APTT PPP: 24.6 SECONDS (ref 22.2–34.2)
BUN SERPL-MCNC: 14 MG/DL (ref 8–23)
BUN/CREAT SERPL: 14.1 (ref 7–25)
CALCIUM SPEC-SCNC: 9.1 MG/DL (ref 8.6–10.5)
CHLORIDE SERPL-SCNC: 103 MMOL/L (ref 98–107)
CO2 SERPL-SCNC: 22.1 MMOL/L (ref 22–29)
CREAT SERPL-MCNC: 0.99 MG/DL (ref 0.76–1.27)
DEPRECATED RDW RBC AUTO: 46.5 FL (ref 37–54)
ERYTHROCYTE [DISTWIDTH] IN BLOOD BY AUTOMATED COUNT: 14.1 % (ref 12.3–15.4)
GFR SERPL CREATININE-BSD FRML MDRD: 76 ML/MIN/1.73
GLUCOSE SERPL-MCNC: 102 MG/DL (ref 65–99)
HCT VFR BLD AUTO: 46.6 % (ref 37.5–51)
HGB BLD-MCNC: 15.8 G/DL (ref 13–17.7)
INR PPP: 0.92 (ref 2–3)
MCH RBC QN AUTO: 30.6 PG (ref 26.6–33)
MCHC RBC AUTO-ENTMCNC: 33.9 G/DL (ref 31.5–35.7)
MCV RBC AUTO: 90.3 FL (ref 79–97)
PLATELET # BLD AUTO: 142 10*3/MM3 (ref 140–450)
PMV BLD AUTO: 10.9 FL (ref 6–12)
POTASSIUM SERPL-SCNC: 4.6 MMOL/L (ref 3.5–5.2)
PROTHROMBIN TIME: 10.3 SECONDS (ref 9.4–12)
QT INTERVAL: 380 MS
RBC # BLD AUTO: 5.16 10*6/MM3 (ref 4.14–5.8)
SODIUM SERPL-SCNC: 136 MMOL/L (ref 136–145)
WBC # BLD AUTO: 7.23 10*3/MM3 (ref 3.4–10.8)

## 2021-11-04 PROCEDURE — 85730 THROMBOPLASTIN TIME PARTIAL: CPT | Performed by: INTERNAL MEDICINE

## 2021-11-04 PROCEDURE — 25010000002 MIDAZOLAM PER 1MG: Performed by: INTERNAL MEDICINE

## 2021-11-04 PROCEDURE — 80048 BASIC METABOLIC PNL TOTAL CA: CPT | Performed by: INTERNAL MEDICINE

## 2021-11-04 PROCEDURE — 0 IOPAMIDOL PER 1 ML: Performed by: INTERNAL MEDICINE

## 2021-11-04 PROCEDURE — C1769 GUIDE WIRE: HCPCS | Performed by: INTERNAL MEDICINE

## 2021-11-04 PROCEDURE — 93458 L HRT ARTERY/VENTRICLE ANGIO: CPT | Performed by: INTERNAL MEDICINE

## 2021-11-04 PROCEDURE — 93005 ELECTROCARDIOGRAM TRACING: CPT | Performed by: INTERNAL MEDICINE

## 2021-11-04 PROCEDURE — 25010000002 FENTANYL CITRATE (PF) 100 MCG/2ML SOLUTION: Performed by: INTERNAL MEDICINE

## 2021-11-04 PROCEDURE — C1894 INTRO/SHEATH, NON-LASER: HCPCS | Performed by: INTERNAL MEDICINE

## 2021-11-04 PROCEDURE — 85027 COMPLETE CBC AUTOMATED: CPT | Performed by: INTERNAL MEDICINE

## 2021-11-04 PROCEDURE — 85610 PROTHROMBIN TIME: CPT | Performed by: INTERNAL MEDICINE

## 2021-11-04 PROCEDURE — S0260 H&P FOR SURGERY: HCPCS | Performed by: INTERNAL MEDICINE

## 2021-11-04 RX ORDER — METOPROLOL TARTRATE 50 MG/1
75 TABLET, FILM COATED ORAL 2 TIMES DAILY
Qty: 270 TABLET | Refills: 1 | Status: SHIPPED | OUTPATIENT
Start: 2021-11-04 | End: 2022-01-31

## 2021-11-04 RX ORDER — NITROGLYCERIN 5 MG/ML
INJECTION, SOLUTION INTRAVENOUS AS NEEDED
Status: DISCONTINUED | OUTPATIENT
Start: 2021-11-04 | End: 2021-11-04 | Stop reason: HOSPADM

## 2021-11-04 RX ORDER — MIDAZOLAM HYDROCHLORIDE 2 MG/2ML
INJECTION, SOLUTION INTRAMUSCULAR; INTRAVENOUS AS NEEDED
Status: DISCONTINUED | OUTPATIENT
Start: 2021-11-04 | End: 2021-11-04 | Stop reason: HOSPADM

## 2021-11-04 RX ORDER — ACETAMINOPHEN 325 MG/1
650 TABLET ORAL EVERY 4 HOURS PRN
Status: CANCELLED | OUTPATIENT
Start: 2021-11-04

## 2021-11-04 RX ORDER — SODIUM CHLORIDE 9 MG/ML
INJECTION, SOLUTION INTRAVENOUS CONTINUOUS PRN
Status: COMPLETED | OUTPATIENT
Start: 2021-11-04 | End: 2021-11-04

## 2021-11-04 RX ORDER — LIDOCAINE HYDROCHLORIDE 20 MG/ML
INJECTION, SOLUTION INFILTRATION; PERINEURAL AS NEEDED
Status: DISCONTINUED | OUTPATIENT
Start: 2021-11-04 | End: 2021-11-04 | Stop reason: HOSPADM

## 2021-11-04 RX ORDER — FENTANYL CITRATE 50 UG/ML
INJECTION, SOLUTION INTRAMUSCULAR; INTRAVENOUS AS NEEDED
Status: DISCONTINUED | OUTPATIENT
Start: 2021-11-04 | End: 2021-11-04 | Stop reason: HOSPADM

## 2021-11-04 RX ORDER — SODIUM CHLORIDE 9 MG/ML
100 INJECTION, SOLUTION INTRAVENOUS CONTINUOUS
Status: ACTIVE | OUTPATIENT
Start: 2021-11-04 | End: 2021-11-04

## 2021-11-04 NOTE — PLAN OF CARE
Goal Outcome Evaluation:  Plan of Care Reviewed With: patient        Progress: improving  Patient is preparing for discharge. No complaints or issues.

## 2021-11-04 NOTE — H&P
[facility]   CARDIOLOGY HISTORY AND PHYSICAL    Patient Name: Patel Nevarez  : 1960  MRN: [id number]  Primary Care Physician:  Ino Maddox MD  Date of admission: 2021    Subjective   Subjective       Reason for visit : Elective cardiac catheterization    HPI:    Patel Nevarez is a 61 y.o. male with congenital heart disease, status post surgery as a child with redo mitral valve repair in 2016, cardiomyopathy, congestive heart failure, history of TIA on long-term Plavix.  During recent office visit he reported increasing fatigue and shortness of breath.  A repeat echocardiogram was performed which showed LV ejection fraction of 30% which was lower than his previous levels.  He was started on appropriate medical therapy.  Subsequently a SPECT stress test was performed which showed an apical infarct.  Low ejection fraction was confirmed during SPECT study as well.  Of note patient has no history of coronary artery disease.  Today, he was brought to the Cath Lab for elective cardiac authorization coronary angiography to identify any ischemic culprits for new cardiomyopathy and positive stress test showing ischemia/infarct.    Patient had one episode of chest pain in the past couple of months.  He is short of breath on mild-to-moderate exertion.  Denies any recent weight gain or pedal edema.  He is actually holding Plavix for 1 week in the anticipation of this cardiac cath.    Review of Systems   All systems were reviewed and negative except for fatigue, chest pain, shortness of breath.    Personal History     Past Medical History:   Diagnosis Date   • Aneurysm (HCC)    • Anxiety    • Cardiomyopathy (HCC)    • Congenital heart disease    • Congestive heart failure (CHF) ([facility])     EF 30% per recent Echo on 2021   • COPD (chronic obstructive pulmonary disease) ([facility])    • DDD (degenerative disc disease), cervical    • Diabetes mellitus ([facility])    • Emphysema of lung ([facility])    • Fibromyalgia     • GERD (gastroesophageal reflux disease)    • Heart attack (HCC)    • Hyperlipidemia    • Hypertension    • Migraine    • Prolapse of mitral valve    • Severe mitral valve regurgitation     s/p MVR annuloplasty ring placement in 2016 at [facility] in [place], [place]   • TIA (transient ischemic attack)    • Tobacco abuse    • Type 2 diabetes mellitus (HCC)        Family History: family history includes Aneurysm in his father; Cancer in his father and mother; Diabetes in his father, mother, and another family member; Heart attack in his father; Hyperlipidemia in his father and mother; Hypertension in his father and mother; Thyroid disease in his mother. Otherwise pertinent FHx was reviewed and not pertinent to current issue.    Social History:  reports that he has been smoking. He has a 5.50 pack-year smoking history. He has never used smokeless tobacco. He reports previous alcohol use. He reports that he does not use drugs.    Home Medications:  Ipratropium-Albuterol, QUEtiapine XR, Semaglutide, albuterol sulfate HFA, aspirin, atorvastatin, clopidogrel, cyclobenzaprine, divalproex, famotidine, gabapentin, gemfibrozil, losartan, metFORMIN ER, metoprolol tartrate, oxyCODONE-acetaminophen, and venlafaxine      Allergies:  Allergies   Allergen Reactions   • Tigan [Trimethobenzamide Hcl] Rash       Objective   Objective     Vitals:   Temp:  [98 °F (36.7 °C)] 98 °F (36.7 °C)  Heart Rate:  [102] 102  Resp:  [16] 16  BP: (131)/(100) 131/100  Physical Exam    Constitutional: Awake, alert   Eyes: PERRLA, sclerae anicteric, no conjunctival injection   HENT: NCAT, mucous membranes moist   Neck: Supple, no thyromegaly, no lymphadenopathy, trachea midline   Respiratory: Clear to auscultation bilaterally, nonlabored respirations    Cardiovascular: RRR, 2/6 systolic murmur heard, no rubs, or gallops, palpable pedal pulses bilaterally   Gastrointestinal: Positive bowel sounds, soft, nontender, nondistended   Musculoskeletal:  No bilateral ankle edema, no clubbing or cyanosis to extremities   Psychiatric: Appropriate affect, cooperative   Neurologic: Oriented x 3, strength symmetric in all extremities, Cranial Nerves grossly intact to confrontation, speech clear   Skin: No rashes     Result Review    Result Review:  I have personally reviewed the results from the time of this admission to 11/4/2021 08:06 EDT and agree with these findings:  [x]  Laboratory  []  Microbiology  [x]  Radiology  [x]  EKG/Telemetry   [x]  Cardiology/Vascular   []  Pathology  [x]  Old records  []  Other:  Most notable findings include:    BMP    BMP 11/4/21   BUN 14   Creatinine 0.99   Sodium 136   Potassium 4.6   Chloride 103   CO2 22.1   Calcium 9.1           CBC    CBC 11/4/21   WBC 7.23   RBC 5.16   Hemoglobin 15.8   Hematocrit 46.6   MCV 90.3   MCH 30.6   MCHC 33.9   RDW 14.1   Platelets 142           Results for orders placed in visit on 08/23/21    Adult Transthoracic Echo Complete w/ Color, Spectral and Contrast if necessary per protocol    Interpretation Summary  · Estimated left ventricular EF = 30% Left ventricular systolic function is moderately decreased.  · There is diastolic dysfunction of left ventricle.  · Mitral valve ring and postsurgical changes of the mitral valve noted.  · Moderate mitral valve stenosis is present.  · Left atrial volume is moderately increased.  · Mild dilation of the aortic root is present. Mild dilation of the sinuses of Valsalva is present.  · Estimated right ventricular systolic pressure from tricuspid regurgitation is mildly elevated (35-45 mmHg).    Results for orders placed during the hospital encounter of 09/10/21    Stress Test With Myocardial Perfusion One Day    Interpretation Summary  · Abnormal myocardial SPECT perfusion study.  · Myocardial perfusion imaging indicates a small-sized infarct located in the apex with no significant ischemia noted.  · There is moderate to severe global hypokinesis of the left  ventricle with apical akinesis.  · Findings consistent with an equivocal ECG stress test.             Assessment/Plan   Assessment / Plan     Brief Patient Summary:  Patel Nevarez is a 61 y.o. male with congenital heart disease, previous mitral valve repair, cardiomyopathy with recent worsening of LV systolic function.  SPECT stress test showed apical ischemia and infarct.    Active Hospital Problems:  Active Hospital Problems    Diagnosis    • **Cardiomyopathy (HCC)      Added automatically from request for surgery [id number]       Congenital heart disease, status post a mitral valve repair twice in the past    Plan:     We will proceed with the left cardiac authorization and coronary angiography to delineate any ischemic culprits.  The risks, benefits and alternatives of the test were explained in detail to the patient and significant other.  Patient agreed to proceed.    Further management plans based on cath outcomes.    DVT prophylaxis: Not indicated since patient is here for an outpatient procedure    CODE STATUS:       Admission Status:  I believe this patient meets outpatient status.    Electronically signed by Satnam Short MD, 11/04/21, 8:06 AM EDT.

## 2021-11-05 DIAGNOSIS — I48.92 ATRIAL FLUTTER, UNSPECIFIED TYPE (HCC): Primary | ICD-10-CM

## 2021-11-22 ENCOUNTER — OFFICE VISIT (OUTPATIENT)
Dept: CARDIOLOGY | Facility: CLINIC | Age: 61
End: 2021-11-22

## 2021-11-22 VITALS
WEIGHT: 192 LBS | HEIGHT: 68 IN | SYSTOLIC BLOOD PRESSURE: 129 MMHG | BODY MASS INDEX: 29.1 KG/M2 | HEART RATE: 103 BPM | DIASTOLIC BLOOD PRESSURE: 92 MMHG

## 2021-11-22 DIAGNOSIS — I42.0 DILATED CARDIOMYOPATHY (HCC): ICD-10-CM

## 2021-11-22 DIAGNOSIS — I48.3 TYPICAL ATRIAL FLUTTER (HCC): Primary | ICD-10-CM

## 2021-11-22 DIAGNOSIS — Z98.890 H/O MITRAL VALVE REPAIR: ICD-10-CM

## 2021-11-22 DIAGNOSIS — I10 ESSENTIAL HYPERTENSION: ICD-10-CM

## 2021-11-22 PROCEDURE — 99214 OFFICE O/P EST MOD 30 MIN: CPT | Performed by: INTERNAL MEDICINE

## 2021-11-22 RX ORDER — QUETIAPINE FUMARATE 300 MG/1
300 TABLET, FILM COATED ORAL NIGHTLY
COMMUNITY

## 2021-11-22 RX ORDER — NALOXONE HYDROCHLORIDE 4 MG/.1ML
SPRAY NASAL
COMMUNITY

## 2021-11-22 RX ORDER — MECLIZINE HYDROCHLORIDE 25 MG/1
25 TABLET ORAL 3 TIMES DAILY PRN
COMMUNITY

## 2021-11-22 RX ORDER — FUROSEMIDE 20 MG/1
20 TABLET ORAL DAILY
COMMUNITY
Start: 2021-01-23 | End: 2022-03-22 | Stop reason: SDUPTHER

## 2021-11-22 RX ORDER — CYANOCOBALAMIN 1000 UG/ML
INJECTION, SOLUTION INTRAMUSCULAR; SUBCUTANEOUS
COMMUNITY
Start: 2021-11-08

## 2021-11-22 RX ORDER — DIAZEPAM 5 MG/1
5 TABLET ORAL EVERY 12 HOURS PRN
COMMUNITY

## 2021-11-22 RX ORDER — NITROGLYCERIN 0.6 MG/1
0.4 TABLET SUBLINGUAL
COMMUNITY

## 2021-11-22 RX ORDER — AMITRIPTYLINE HYDROCHLORIDE 100 MG/1
TABLET, FILM COATED ORAL NIGHTLY
COMMUNITY
Start: 2021-10-14

## 2021-12-01 NOTE — PROGRESS NOTES
Subjective:     Encounter Date:12/02/2021      Patient ID: Patel Nevarez is a 61 y.o. male.    Chief Complaint:  Chief Complaint   Patient presents with   • Establish Care   • Atrial Fibrillation       HPI:  Mr Nevarez is a 60 yo patient of  who is seen as a new patient for evaluation of atrial flutter.  His past medical history is significant for HTN, HLD, DM, prior stroke, COPD and mitral valve repair in 1972 and again in 2016.  For the past several months he has noted a marked decline in his stamina associated with increased exertional dyspnea and fatigue. He has also noted that his pulse rate has been rapid, up into the 140's.  He was seen by Dr. Valladares and an EKG demonstrated atrial flutter with 2:1 AV conduction.  He was placed on apixaban and metoprolol.  His dose of metoprolol was titrated up but he has continued to be tachycardic.    His prior cardiac evaluation has included an echo 8/21 showed an EF of 30% with a mitral ring and moderate MS and mild TR.  A cath 11/21 which showed no significant CAD and an EF of 30-35%.          The following portions of the patient's history were reviewed and updated as appropriate: allergies, current medications, past family history, past medical history, past social history, past surgical history and problem list.    Problem List:  Patient Active Problem List   Diagnosis   • Thoracic aortic ectasia (HCC)   • Essential hypertension   • DM type 2 (diabetes mellitus, type 2) ([facility])   • Mixed hyperlipidemia   • COPD (chronic obstructive pulmonary disease) ([facility])   • Tobacco abuse   • Cardiomyopathy ([facility])   • Atrial flutter ([facility])   • H/O mitral valve repair       Past Medical History:  Past Medical History:   Diagnosis Date   • Aneurysm ([facility])    • Anxiety    • Cardiomyopathy ([facility])    • Congenital heart disease    • Congestive heart failure (CHF) ([facility])     EF 30% per recent Echo on 08/24/2021   • COPD (chronic obstructive pulmonary disease) ([facility])    • DDD  (degenerative disc disease), cervical    • Diabetes mellitus (HCC)    • Emphysema of lung (HCC)    • Fibromyalgia    • GERD (gastroesophageal reflux disease)    • Heart attack (HCC)    • Hyperlipidemia    • Hypertension    • Migraine    • Prolapse of mitral valve    • Severe mitral valve regurgitation     s/p MVR annuloplasty ring placement in 2016 at [facility] in [place], [place]   • TIA (transient ischemic attack)    • Tobacco abuse    • Type 2 diabetes mellitus (HCC)        Past Surgical History:  Past Surgical History:   Procedure Laterality Date   • APPENDECTOMY     • ASD REPAIR, OSTIUM PRIMUM  1972 1972   • CARDIAC CATHETERIZATION N/A 11/4/2021    Procedure: Left Heart Cath, possible angioplasty;  Surgeon: Satnam Short MD;  Location: [facility] INVASIVE LOCATION;  Service: Cardiovascular;  Laterality: N/A;   • CARDIAC SURGERY  1972.2016    OPEN HEART SURGERY   • ELBOW PROCEDURE     • MITRAL VALVE REPAIR/REPLACEMENT  1972, 2016    annuloplasty ring placement in 2016 at [facility]   • TONSILLECTOMY         Social History:  Social History     Socioeconomic History   • Marital status:    Tobacco Use   • Smoking status: Current Every Day Smoker     Packs/day: 0.25     Years: 22.00     Pack years: 5.50   • Smokeless tobacco: Never Used   • Tobacco comment: 1-3  A DAY   Vaping Use   • Vaping Use: Never used   Substance and Sexual Activity   • Alcohol use: Never   • Drug use: Never   • Sexual activity: Defer       Allergies:  Allergies   Allergen Reactions   • Tigan [Trimethobenzamide Hcl] Rash   • Mushroom Nausea And Vomiting       Immunizations:  Immunization History   Administered Date(s) Administered   • COVID-19 (MODERNA) 1st, 2nd, 3rd Dose Only 03/16/2021, 04/13/2021   • COVID-19 (PFIZER) 11/05/2021   • FluLaval/Fluarix/Fluzone >6 09/25/2019   • Influenza, Unspecified 10/09/2020   • Pneumococcal Conjugate 13-Valent (PCV13) 09/25/2019   • Pneumococcal Polysaccharide (PPSV23)  "10/30/2020   • Shingrix 01/12/2021   • Tdap 08/26/2020       ROS:  Review of Systems   Constitutional: Positive for malaise/fatigue.   Cardiovascular: Positive for dyspnea on exertion, irregular heartbeat, leg swelling and palpitations. Negative for chest pain, near-syncope, orthopnea and syncope.   Respiratory: Positive for shortness of breath.           Objective:         /80   Pulse 104   Resp 18   Ht 172.7 cm (68\")   Wt 88.5 kg (195 lb)   BMI 29.65 kg/m²     Constitutional:       General: Not in acute distress.     Appearance: Well-developed.   Eyes:      General: No scleral icterus.     Conjunctiva/sclera: Conjunctivae normal.      Pupils: Pupils are equal, round, and reactive to light.   HENT:      Head: Normocephalic and atraumatic.   Neck:      Thyroid: No thyromegaly.   Pulmonary:      Effort: Pulmonary effort is normal.      Breath sounds: Normal breath sounds.   Cardiovascular:      Tachycardia present. Irregular rhythm.   Abdominal:      General: Bowel sounds are normal.      Palpations: Abdomen is soft.   Musculoskeletal: Normal range of motion.      Cervical back: Normal range of motion. Skin:     General: Skin is warm and dry.   Neurological:      Mental Status: Alert and oriented to person, place, and time.         In-Office Procedure(s):  Procedures    ASCVD RIsk Score::  The ASCVD Risk score (Lukas JENNIFER Jr., et al., 2013) failed to calculate for the following reasons:    Cannot find a previous HDL lab    Cannot find a previous total cholesterol lab    Recent Radiology:  Imaging Results (Most Recent)     None          Lab Review:   Admission on 11/04/2021, Discharged on 11/04/2021   Component Date Value   • QT Interval 11/04/2021 380    • WBC 11/04/2021 7.23    • RBC 11/04/2021 5.16    • Hemoglobin 11/04/2021 15.8    • Hematocrit 11/04/2021 46.6    • MCV 11/04/2021 90.3    • MCH 11/04/2021 30.6    • MCHC 11/04/2021 33.9    • RDW 11/04/2021 14.1    • RDW-SD 11/04/2021 46.5    • MPV 11/04/2021 " 10.9    • Platelets 11/04/2021 142    • Glucose 11/04/2021 102*   • BUN 11/04/2021 14    • Creatinine 11/04/2021 0.99    • Sodium 11/04/2021 136    • Potassium 11/04/2021 4.6    • Chloride 11/04/2021 103    • CO2 11/04/2021 22.1    • Calcium 11/04/2021 9.1    • eGFR Non  Amer 11/04/2021 76    • BUN/Creatinine Ratio 11/04/2021 14.1    • Anion Gap 11/04/2021 10.9    • PTT 11/04/2021 24.6    • Protime 11/04/2021 10.3    • INR 11/04/2021 0.92*                Assessment:          Diagnosis Plan   1. Typical atrial flutter (HCC)     2. Dilated cardiomyopathy (HCC)     3. H/O mitral valve repair            Plan:      1. Atrial flutter with rapid ventricular response - appears to be typical atrial flutter.  He has been on apixaban for over a month.  Discussed indications, risks and benefits of ablation, will schedule ASAP.    2. MV repair with moderate MS - per Dr. Short    3. HTN - controlled    4. HLD - statin and gemfibrozil    5. DM - on metformin    6. H/o stroke - on apixaban and ASA      Level of Care:                 Jv Turpin MD  12/01/21  .

## 2021-12-02 ENCOUNTER — OFFICE VISIT (OUTPATIENT)
Dept: CARDIOLOGY | Facility: CLINIC | Age: 61
End: 2021-12-02

## 2021-12-02 VITALS
BODY MASS INDEX: 29.55 KG/M2 | SYSTOLIC BLOOD PRESSURE: 144 MMHG | WEIGHT: 195 LBS | RESPIRATION RATE: 18 BRPM | DIASTOLIC BLOOD PRESSURE: 80 MMHG | HEIGHT: 68 IN | HEART RATE: 104 BPM

## 2021-12-02 DIAGNOSIS — Z98.890 H/O MITRAL VALVE REPAIR: ICD-10-CM

## 2021-12-02 DIAGNOSIS — I48.92 ATRIAL FLUTTER, UNSPECIFIED TYPE (HCC): Primary | ICD-10-CM

## 2021-12-02 DIAGNOSIS — I42.0 DILATED CARDIOMYOPATHY (HCC): ICD-10-CM

## 2021-12-02 DIAGNOSIS — I48.3 TYPICAL ATRIAL FLUTTER (HCC): Primary | ICD-10-CM

## 2021-12-02 PROCEDURE — 99204 OFFICE O/P NEW MOD 45 MIN: CPT | Performed by: INTERNAL MEDICINE

## 2021-12-03 ENCOUNTER — TRANSCRIBE ORDERS (OUTPATIENT)
Dept: CARDIOLOGY | Facility: CLINIC | Age: 61
End: 2021-12-03

## 2021-12-03 DIAGNOSIS — Z01.818 PREOP TESTING: Primary | ICD-10-CM

## 2021-12-03 DIAGNOSIS — I48.92 ATRIAL FLUTTER, UNSPECIFIED TYPE (HCC): ICD-10-CM

## 2021-12-03 DIAGNOSIS — Z01.818 OTHER SPECIFIED PRE-OPERATIVE EXAMINATION: Primary | ICD-10-CM

## 2021-12-03 PROBLEM — Z98.890 H/O MITRAL VALVE REPAIR: Status: ACTIVE | Noted: 2021-12-03

## 2021-12-05 NOTE — ASSESSMENT & PLAN NOTE
Dilated cardiomyopathy with LV ejection fraction 30 to 35% per LV gram.  Coronaries are normal.  Likely etiology is tachycardia from atrial flutter with valvular heart disease.  Losartan was started recently which will be continued.  Will increase the dose of metoprolol today.  Continue Lasix as well.  Need a repeat echocardiogram over the next 6 months once atrial flutter is taken care of.

## 2021-12-05 NOTE — ASSESSMENT & PLAN NOTE
Recent diagnosis, appears to be typical flutter.  Heart rate is not well controlled.  At baseline it is above 100 and with activities it goes usually to 140s.  Patient reports feeling weak and tired.  Will increase dose of metoprolol further to 75 mg in the morning and 100 mg at night.  Continue Eliquis for anticoagulation.  Plavix was stopped once Eliquis initiated.  He already has an appointment with cardiac electrophysiologist next week, to consider ablation.

## 2021-12-05 NOTE — PROGRESS NOTES
CARDIOLOGY FOLLOW-UP PROGRESS NOTE        Chief Complaint  Palpitations (Follow-up), Hypertension, Congestive Heart Failure, and Cardiac Valve Problem    Subjective            Patel Nevarez presents to [facility] CARDIOLOGY  History of Present Illness    Mr. Nevarez is here as a follow-up from recent Cardiac catheterization.  He was noted to have a decrease in LV systolic function per echocardiogram.  Cardiac cath showed normal coronary arteries.  He was also noted to have atrial flutter with a 2-1 conduction block per EKGs done during recent cardiac cath.  Eliquis was initiated.  He still reports having fatigue and weakness along with tachycardia.  Heart rate is always above 100 and with activity to go up to 150.  He also reports having episodes of sweating and feeling hot intermittently.  Denies any chest pain.  Shortness of breath at baseline.  There is no recent weight gain.  There is no pedal edema.       Past History:     1) Congenital heart disease status post mitral valve repair and closure of atrial septal defect in 1972; 2) Severe mitral regurgitation status post redo mitral valve repair with annuloplasty ring placement in 2016 at [facility] in [place]; 3) Recurrent TIAs, on long-term Plavix; 4) Impulse control disorder; 5) Hypertension; 6) Hyperlipidemia; 7) Diabetes mellitus; 8) Chronic obstructive pulmonary disease; 9) Ascending aortic aneurysm, measuring 4.3 cm in diameter.     Medical History:  Past Medical History:   Diagnosis Date   • Aneurysm (HCC)    • Anxiety    • Cardiomyopathy (HCC)    • Congenital heart disease    • Congestive heart failure (CHF) ([facility])     EF 30% per recent Echo on 08/24/2021   • COPD (chronic obstructive pulmonary disease) ([facility])    • DDD (degenerative disc disease), cervical    • Diabetes mellitus ([facility])    • Emphysema of lung (HCC)    • Fibromyalgia    • GERD (gastroesophageal reflux disease)    • Heart attack (HCC)    • Hyperlipidemia    •  Hypertension    • Migraine    • Prolapse of mitral valve    • Severe mitral valve regurgitation     s/p MVR annuloplasty ring placement in 2016 at [facility] in [place], [place]   • TIA (transient ischemic attack)    • Tobacco abuse    • Type 2 diabetes mellitus (HCC)        Surgical History: has a past surgical history that includes Tonsillectomy; Appendectomy; Elbow surgery; ASD repair, ostium primum (1972); Mitral valve repair (1972,  2016); Cardiac surgery (1972.2016); and Cardiac catheterization (N/A, 11/4/2021).     Family History: family history includes Aneurysm in his father; Cancer in his father and mother; Diabetes in his father, mother, and another family member; Heart attack in his father; Hyperlipidemia in his father and mother; Hypertension in his father and mother; Thyroid disease in his mother.     Social History: reports that he has been smoking. He has a 5.50 pack-year smoking history. He has never used smokeless tobacco. He reports that he does not drink alcohol and does not use drugs.    Allergies: Tigan [trimethobenzamide hcl] and Mushroom    Current Outpatient Medications on File Prior to Visit   Medication Sig   • albuterol sulfate  (90 Base) MCG/ACT inhaler Inhale 2 puffs Every 4 (Four) Hours As Needed for Wheezing.   • amitriptyline (ELAVIL) 100 MG tablet Every Night.   • apixaban (ELIQUIS) 5 MG tablet tablet Take 1 tablet by mouth 2 (Two) Times a Day.   • aspirin  MG tablet Take 325 mg by mouth 2 (Two) Times a Day.   • atorvastatin (LIPITOR) 40 MG tablet Take 40 mg by mouth Daily.   • cyanocobalamin 1000 MCG/ML injection INJECT 1 ML INTRAMUSCULARLY ONCE A WEEK   • cyclobenzaprine (FLEXERIL) 5 MG tablet Take 5 mg by mouth 3 (Three) Times a Day As Needed for Muscle Spasms.   • diazePAM (VALIUM) 5 MG tablet diazepam 5 mg tablet   TAKE 1 TABLET BY MOUTH TWICE DAILY   • furosemide (LASIX) 20 MG tablet furosemide 20 mg tablet   TAKE 1 TABLET BY MOUTH ONCE DAILY   • gabapentin  "(NEURONTIN) 100 MG capsule Take 100 mg by mouth 2 (Two) Times a Day.   • gemfibrozil (LOPID) 600 MG tablet Take 600 mg by mouth 2 (Two) Times a Day Before Meals.   • Ipratropium-Albuterol (ALBUTEROL-IPRATROPIUM IN) Inhale.   • losartan (Cozaar) 25 MG tablet Take 1 tablet by mouth Daily.   • meclizine (ANTIVERT) 25 MG tablet Take 25 mg by mouth 3 (Three) Times a Day As Needed.   • metFORMIN ER (GLUCOPHAGE-XR) 750 MG 24 hr tablet Take 750 mg by mouth Daily With Breakfast & Dinner. 2 tabs PM   • metoprolol tartrate (LOPRESSOR) 50 MG tablet Take 1.5 tablets by mouth 2 (Two) Times a Day. (Patient taking differently: Take 100 mg by mouth 2 (Two) Times a Day.)   • naloxone (Narcan) 4 MG/0.1ML nasal spray Narcan 4 mg/actuation nasal spray   • nitroglycerin (NITROSTAT) 0.6 MG SL tablet nitroglycerin 0.6 mg sublingual tablet   • oxyCODONE-acetaminophen (PERCOCET) 5-325 MG per tablet Take 1 tablet by mouth Every 6 (Six) Hours As Needed.   • QUEtiapine (SEROquel) 300 MG tablet Take 300 mg by mouth Every Night.   • SEMAGLUTIDE,0.25 OR 0.5MG/DOS, SC Inject 0.5 mg under the skin into the appropriate area as directed 1 (One) Time Per Week.   • venlafaxine (EFFEXOR) 75 MG tablet Take 225 mg by mouth 3 (Three) Times a Day.     No current facility-administered medications on file prior to visit.          Review of Systems   Constitutional: Positive for fatigue.   Respiratory: Negative for cough, shortness of breath and wheezing.    Cardiovascular: Positive for palpitations. Negative for chest pain and leg swelling.   Gastrointestinal: Negative for nausea and vomiting.   Neurological: Negative for dizziness and syncope.        Objective     /92   Pulse 103   Ht 172.7 cm (68\")   Wt 87.1 kg (192 lb)   BMI 29.19 kg/m²       Physical Exam    General : Alert, awake, no acute distress  Neck : Supple, no carotid bruit, no jugular venous distention  CVS : Tachycardic, regular, no murmur, rubs or gallops  Lungs: Clear to auscultation " bilaterally, no crackles or rhonchi  Abdomen: Soft, nontender, bowel sounds heard in all 4 quadrants  Extremities: Warm, well-perfused, no pedal edema    Result Review :     The following data was reviewed by: Satnam Short MD on 11/22/2021:    CMP    CMP 11/4/21   Glucose 102 (A)   BUN 14   Creatinine 0.99   eGFR Non African Am 76   Sodium 136   Potassium 4.6   Chloride 103   Calcium 9.1   (A) Abnormal value            CBC    CBC 11/4/21   WBC 7.23   RBC 5.16   Hemoglobin 15.8   Hematocrit 46.6   MCV 90.3   MCH 30.6   MCHC 33.9   RDW 14.1   Platelets 142                    Data reviewed: Cardiology studies              Results for orders placed in visit on 08/23/21    Adult Transthoracic Echo Complete w/ Color, Spectral and Contrast if necessary per protocol    Interpretation Summary  · Estimated left ventricular EF = 30% Left ventricular systolic function is moderately decreased.  · There is diastolic dysfunction of left ventricle.  · Mitral valve ring and postsurgical changes of the mitral valve noted.  · Moderate mitral valve stenosis is present.  · Left atrial volume is moderately increased.  · Mild dilation of the aortic root is present. Mild dilation of the sinuses of Valsalva is present.  · Estimated right ventricular systolic pressure from tricuspid regurgitation is mildly elevated (35-45 mmHg).      Results for orders placed during the hospital encounter of 09/10/21    Stress Test With Myocardial Perfusion One Day    Interpretation Summary  · Abnormal myocardial SPECT perfusion study.  · Myocardial perfusion imaging indicates a small-sized infarct located in the apex with no significant ischemia noted.  · There is moderate to severe global hypokinesis of the left ventricle with apical akinesis.  · Findings consistent with an equivocal ECG stress test.      Cardiac cath done on 11/4/2021 showed    1. Normal epicardial coronary anatomy with no angiographically significant stenosis noted in any of the  arteries  2. Moderately decreased left ventricular systolic function with estimated LV ejection fraction of 30 to 35% with a dilated LV  3. Normal left ventricular end-diastolic pressure  4. There are no culprit lesions identified to explain cardiomyopathy and positive stress test, indicating that the cardiomyopathy is nonischemic.                 Assessment and Plan        Diagnoses and all orders for this visit:    1. Typical atrial flutter (HCC) (Primary)  Assessment & Plan:  Recent diagnosis, appears to be typical flutter.  Heart rate is not well controlled.  At baseline it is above 100 and with activities it goes usually to 140s.  Patient reports feeling weak and tired.  Will increase dose of metoprolol further to 75 mg in the morning and 100 mg at night.  Continue Eliquis for anticoagulation.  Plavix was stopped once Eliquis initiated.  He already has an appointment with cardiac electrophysiologist next week, to consider ablation.      2. H/O mitral valve repair  Assessment & Plan:  Currently has moderate stenosis and mild mitral regurgitation after recent mitral valve repair.  We will continue to monitor clinically and by serial echocardiograms yearly.      3. Essential hypertension  Assessment & Plan:  Reasonably well controlled.  Metoprolol dose adjustment as mentioned above.      4. Dilated cardiomyopathy (HCC)  Assessment & Plan:  Dilated cardiomyopathy with LV ejection fraction 30 to 35% per LV gram.  Coronaries are normal.  Likely etiology is tachycardia from atrial flutter with valvular heart disease.  Losartan was started recently which will be continued.  Will increase the dose of metoprolol today.  Continue Lasix as well.  Need a repeat echocardiogram over the next 6 months once atrial flutter is taken care of.              Follow Up     Return in about 4 months (around 3/22/2022) for Next scheduled follow up.    Patient was given instructions and counseling regarding his condition or for health  maintenance advice. Please see specific information pulled into the AVS if appropriate.

## 2021-12-05 NOTE — ASSESSMENT & PLAN NOTE
Currently has moderate stenosis and mild mitral regurgitation after recent mitral valve repair.  We will continue to monitor clinically and by serial echocardiograms yearly.

## 2021-12-06 ENCOUNTER — LAB (OUTPATIENT)
Dept: LAB | Facility: HOSPITAL | Age: 61
End: 2021-12-06

## 2021-12-06 ENCOUNTER — TRANSCRIBE ORDERS (OUTPATIENT)
Dept: LAB | Facility: HOSPITAL | Age: 61
End: 2021-12-06

## 2021-12-06 DIAGNOSIS — Z01.818 PRE-OP TESTING: Primary | ICD-10-CM

## 2021-12-06 DIAGNOSIS — I48.92 ATRIAL FLUTTER, UNSPECIFIED TYPE (HCC): ICD-10-CM

## 2021-12-06 DIAGNOSIS — Z01.818 PREOP TESTING: ICD-10-CM

## 2021-12-06 DIAGNOSIS — Z01.818 PRE-OP TESTING: ICD-10-CM

## 2021-12-06 LAB
ALBUMIN SERPL-MCNC: 5 G/DL (ref 3.5–5.2)
ALBUMIN/GLOB SERPL: 1.6 G/DL
ALP SERPL-CCNC: 122 U/L (ref 39–117)
ALT SERPL W P-5'-P-CCNC: 42 U/L (ref 1–41)
ANION GAP SERPL CALCULATED.3IONS-SCNC: 10.6 MMOL/L (ref 5–15)
AST SERPL-CCNC: 25 U/L (ref 1–40)
BASOPHILS # BLD AUTO: 0.08 10*3/MM3 (ref 0–0.2)
BASOPHILS NFR BLD AUTO: 1.1 % (ref 0–1.5)
BILIRUB SERPL-MCNC: 0.3 MG/DL (ref 0–1.2)
BUN SERPL-MCNC: 13 MG/DL (ref 8–23)
BUN/CREAT SERPL: 12.4 (ref 7–25)
CALCIUM SPEC-SCNC: 10.5 MG/DL (ref 8.6–10.5)
CHLORIDE SERPL-SCNC: 104 MMOL/L (ref 98–107)
CO2 SERPL-SCNC: 26.4 MMOL/L (ref 22–29)
CREAT SERPL-MCNC: 1.05 MG/DL (ref 0.76–1.27)
DEPRECATED RDW RBC AUTO: 41.5 FL (ref 37–54)
EOSINOPHIL # BLD AUTO: 0.16 10*3/MM3 (ref 0–0.4)
EOSINOPHIL NFR BLD AUTO: 2.2 % (ref 0.3–6.2)
ERYTHROCYTE [DISTWIDTH] IN BLOOD BY AUTOMATED COUNT: 12.7 % (ref 12.3–15.4)
GFR SERPL CREATININE-BSD FRML MDRD: 71 ML/MIN/1.73
GLOBULIN UR ELPH-MCNC: 3.1 GM/DL
GLUCOSE SERPL-MCNC: 103 MG/DL (ref 65–99)
HCT VFR BLD AUTO: 49.2 % (ref 37.5–51)
HGB BLD-MCNC: 16.8 G/DL (ref 13–17.7)
IMM GRANULOCYTES # BLD AUTO: 0.06 10*3/MM3 (ref 0–0.05)
IMM GRANULOCYTES NFR BLD AUTO: 0.8 % (ref 0–0.5)
INR PPP: 0.92 (ref 2–3)
LYMPHOCYTES # BLD AUTO: 2.38 10*3/MM3 (ref 0.7–3.1)
LYMPHOCYTES NFR BLD AUTO: 32.2 % (ref 19.6–45.3)
MCH RBC QN AUTO: 30.5 PG (ref 26.6–33)
MCHC RBC AUTO-ENTMCNC: 34.1 G/DL (ref 31.5–35.7)
MCV RBC AUTO: 89.3 FL (ref 79–97)
MONOCYTES # BLD AUTO: 0.54 10*3/MM3 (ref 0.1–0.9)
MONOCYTES NFR BLD AUTO: 7.3 % (ref 5–12)
NEUTROPHILS NFR BLD AUTO: 4.16 10*3/MM3 (ref 1.7–7)
NEUTROPHILS NFR BLD AUTO: 56.4 % (ref 42.7–76)
NRBC BLD AUTO-RTO: 0 /100 WBC (ref 0–0.2)
PLATELET # BLD AUTO: 180 10*3/MM3 (ref 140–450)
PMV BLD AUTO: 11.5 FL (ref 6–12)
POTASSIUM SERPL-SCNC: 5.1 MMOL/L (ref 3.5–5.2)
PROT SERPL-MCNC: 8.1 G/DL (ref 6–8.5)
PROTHROMBIN TIME: 9.8 SECONDS (ref 9.4–12)
RBC # BLD AUTO: 5.51 10*6/MM3 (ref 4.14–5.8)
SARS-COV-2 N GENE RESP QL NAA+PROBE: NOT DETECTED
SODIUM SERPL-SCNC: 141 MMOL/L (ref 136–145)
WBC NRBC COR # BLD: 7.38 10*3/MM3 (ref 3.4–10.8)

## 2021-12-06 PROCEDURE — C9803 HOPD COVID-19 SPEC COLLECT: HCPCS

## 2021-12-06 PROCEDURE — 85610 PROTHROMBIN TIME: CPT

## 2021-12-06 PROCEDURE — 80053 COMPREHEN METABOLIC PANEL: CPT

## 2021-12-06 PROCEDURE — 85025 COMPLETE CBC W/AUTO DIFF WBC: CPT

## 2021-12-06 PROCEDURE — 36415 COLL VENOUS BLD VENIPUNCTURE: CPT

## 2021-12-06 PROCEDURE — 87635 SARS-COV-2 COVID-19 AMP PRB: CPT

## 2021-12-07 NOTE — H&P
Patient Care Team:  Ino Maddox MD as PCP - General (Internal Medicine)    Chief complaint Presents for flutter ablation    Subjective     History of Present Illness   Mr Nevarez is a 62 yo patient of  who is seen as a new patient for evaluation of atrial flutter.  His past medical history is significant for HTN, HLD, DM, prior stroke, COPD and mitral valve repair in 1972 and again in 2016.  For the past several months he has noted a marked decline in his stamina associated with increased exertional dyspnea and fatigue. He has also noted that his pulse rate has been rapid, up into the 140's.  He was seen by Dr. Valladares and an EKG demonstrated atrial flutter with 2:1 AV conduction.  He was placed on apixaban and metoprolol.  His dose of metoprolol was titrated up but he has continued to be tachycardic.     His prior cardiac evaluation has included an echo 8/21 showed an EF of 30% with a mitral ring and moderate MS and mild TR.  A cath 11/21 which showed no significant CAD and an EF of 30-35%.     Review of Systems   Constitutional: Positive for fatigue.   Respiratory: Positive for shortness of breath.    Cardiovascular: Positive for palpitations. Negative for chest pain and leg swelling.   All other systems reviewed and are negative.         Past Medical History:   Diagnosis Date   • Aneurysm ([facility])    • Anxiety    • Cardiomyopathy ([facility])    • Congenital heart disease    • Congestive heart failure (CHF) ([facility])     EF 30% per recent Echo on 08/24/2021   • COPD (chronic obstructive pulmonary disease) ([facility])    • DDD (degenerative disc disease), cervical    • Diabetes mellitus ([facility])    • Emphysema of lung ([facility])    • Fibromyalgia    • GERD (gastroesophageal reflux disease)    • Heart attack ([facility])    • Hyperlipidemia    • Hypertension    • Migraine    • Prolapse of mitral valve    • Severe mitral valve regurgitation     s/p MVR annuloplasty ring placement in 2016 at [facility] in [place], [place]    • TIA (transient ischemic attack)    • Tobacco abuse    • Type 2 diabetes mellitus (HCC)        Objective      Vital Signs       Physical Exam  Constitutional:       Appearance: Normal appearance.   HENT:      Head: Normocephalic and atraumatic.      Nose: Nose normal.      Mouth/Throat:      Mouth: Mucous membranes are moist.   Eyes:      Extraocular Movements: Extraocular movements intact.      Pupils: Pupils are equal, round, and reactive to light.   Cardiovascular:      Rate and Rhythm: Tachycardia present. Rhythm irregular.   Pulmonary:      Effort: Pulmonary effort is normal.      Breath sounds: Normal breath sounds.   Abdominal:      Palpations: Abdomen is soft.   Musculoskeletal:         General: Normal range of motion.      Cervical back: Normal range of motion and neck supple.   Skin:     General: Skin is warm and dry.   Neurological:      General: No focal deficit present.      Mental Status: He is alert and oriented to person, place, and time.         Results Review:    I reviewed the patient's new clinical results.      Assessment/Plan       Atrial flutter (HCC)      Assessment & Plan   1. Atrial flutter with rapid ventricular response - appears to be typical atrial flutter.  He has been on apixaban for over a month.  Discussed indications, risks and benefits of ablation, will schedule ASAP.     2. MV repair with moderate MS - per Dr. Short     3. HTN - controlled     4. HLD - statin and gemfibrozil     5. DM - on metformin     6. H/o stroke - on apixaban and ASA    I discussed the patients findings and my recommendations with patient    Jv Turpin MD  12/07/21  15:58 EST

## 2021-12-08 ENCOUNTER — HOSPITAL ENCOUNTER (OUTPATIENT)
Facility: HOSPITAL | Age: 61
Setting detail: HOSPITAL OUTPATIENT SURGERY
Discharge: HOME | End: 2021-12-08
Attending: INTERNAL MEDICINE | Admitting: INTERNAL MEDICINE

## 2021-12-08 VITALS
HEIGHT: 68 IN | HEART RATE: 111 BPM | OXYGEN SATURATION: 94 % | SYSTOLIC BLOOD PRESSURE: 95 MMHG | RESPIRATION RATE: 16 BRPM | BODY MASS INDEX: 29.55 KG/M2 | DIASTOLIC BLOOD PRESSURE: 73 MMHG | TEMPERATURE: 97.6 F | WEIGHT: 195 LBS

## 2021-12-08 DIAGNOSIS — I48.92 ATRIAL FLUTTER, UNSPECIFIED TYPE (HCC): ICD-10-CM

## 2021-12-08 LAB
GLUCOSE BLDC GLUCOMTR-MCNC: 104 MG/DL (ref 70–130)
GLUCOSE BLDC GLUCOMTR-MCNC: 97 MG/DL (ref 70–130)

## 2021-12-08 PROCEDURE — 99153 MOD SED SAME PHYS/QHP EA: CPT | Performed by: INTERNAL MEDICINE

## 2021-12-08 PROCEDURE — 93613 INTRACARDIAC EPHYS 3D MAPG: CPT | Performed by: INTERNAL MEDICINE

## 2021-12-08 PROCEDURE — C1766 INTRO/SHEATH,STRBLE,NON-PEEL: HCPCS | Performed by: INTERNAL MEDICINE

## 2021-12-08 PROCEDURE — 93005 ELECTROCARDIOGRAM TRACING: CPT | Performed by: INTERNAL MEDICINE

## 2021-12-08 PROCEDURE — 93653 COMPRE EP EVAL TX SVT: CPT | Performed by: INTERNAL MEDICINE

## 2021-12-08 PROCEDURE — C1732 CATH, EP, DIAG/ABL, 3D/VECT: HCPCS | Performed by: INTERNAL MEDICINE

## 2021-12-08 PROCEDURE — C1894 INTRO/SHEATH, NON-LASER: HCPCS | Performed by: INTERNAL MEDICINE

## 2021-12-08 PROCEDURE — 93655 ICAR CATH ABLTJ DSCRT ARRHYT: CPT | Performed by: INTERNAL MEDICINE

## 2021-12-08 PROCEDURE — C1731 CATH, EP, 20 OR MORE ELEC: HCPCS | Performed by: INTERNAL MEDICINE

## 2021-12-08 PROCEDURE — C2630 CATH, EP, COOL-TIP: HCPCS | Performed by: INTERNAL MEDICINE

## 2021-12-08 PROCEDURE — 82962 GLUCOSE BLOOD TEST: CPT

## 2021-12-08 PROCEDURE — 99152 MOD SED SAME PHYS/QHP 5/>YRS: CPT | Performed by: INTERNAL MEDICINE

## 2021-12-08 PROCEDURE — 25010000002 FENTANYL CITRATE (PF) 50 MCG/ML SOLUTION: Performed by: INTERNAL MEDICINE

## 2021-12-08 PROCEDURE — C1733 CATH, EP, OTHR THAN COOL-TIP: HCPCS | Performed by: INTERNAL MEDICINE

## 2021-12-08 PROCEDURE — C1730 CATH, EP, 19 OR FEW ELECT: HCPCS | Performed by: INTERNAL MEDICINE

## 2021-12-08 PROCEDURE — 25010000002 MIDAZOLAM PER 1 MG: Performed by: INTERNAL MEDICINE

## 2021-12-08 RX ORDER — SODIUM CHLORIDE 9 MG/ML
75 INJECTION, SOLUTION INTRAVENOUS CONTINUOUS
Status: DISCONTINUED | OUTPATIENT
Start: 2021-12-08 | End: 2021-12-08 | Stop reason: HOSPADM

## 2021-12-08 RX ORDER — SODIUM CHLORIDE 0.9 % (FLUSH) 0.9 %
10 SYRINGE (ML) INJECTION EVERY 12 HOURS SCHEDULED
Status: CANCELLED | OUTPATIENT
Start: 2021-12-08

## 2021-12-08 RX ORDER — SODIUM CHLORIDE 0.9 % (FLUSH) 0.9 %
3 SYRINGE (ML) INJECTION EVERY 12 HOURS SCHEDULED
Status: DISCONTINUED | OUTPATIENT
Start: 2021-12-08 | End: 2021-12-08 | Stop reason: HOSPADM

## 2021-12-08 RX ORDER — SODIUM CHLORIDE 0.9 % (FLUSH) 0.9 %
1-10 SYRINGE (ML) INJECTION AS NEEDED
Status: CANCELLED | OUTPATIENT
Start: 2021-12-08

## 2021-12-08 RX ORDER — OXYCODONE HYDROCHLORIDE AND ACETAMINOPHEN 5; 325 MG/1; MG/1
1 TABLET ORAL EVERY 4 HOURS PRN
Status: CANCELLED | OUTPATIENT
Start: 2021-12-08 | End: 2021-12-15

## 2021-12-08 RX ORDER — LIDOCAINE HYDROCHLORIDE AND EPINEPHRINE 10; 10 MG/ML; UG/ML
INJECTION, SOLUTION INFILTRATION; PERINEURAL AS NEEDED
Status: DISCONTINUED | OUTPATIENT
Start: 2021-12-08 | End: 2021-12-08 | Stop reason: HOSPADM

## 2021-12-08 RX ORDER — MIDAZOLAM HYDROCHLORIDE 1 MG/ML
INJECTION INTRAMUSCULAR; INTRAVENOUS AS NEEDED
Status: DISCONTINUED | OUTPATIENT
Start: 2021-12-08 | End: 2021-12-08 | Stop reason: HOSPADM

## 2021-12-08 RX ORDER — SODIUM CHLORIDE 0.9 % (FLUSH) 0.9 %
10 SYRINGE (ML) INJECTION AS NEEDED
Status: DISCONTINUED | OUTPATIENT
Start: 2021-12-08 | End: 2021-12-08 | Stop reason: HOSPADM

## 2021-12-08 RX ORDER — OXYCODONE HYDROCHLORIDE 5 MG/1
5 TABLET ORAL ONCE AS NEEDED
Status: COMPLETED | OUTPATIENT
Start: 2021-12-08 | End: 2021-12-08

## 2021-12-08 RX ORDER — FENTANYL CITRATE 50 UG/ML
INJECTION, SOLUTION INTRAMUSCULAR; INTRAVENOUS AS NEEDED
Status: DISCONTINUED | OUTPATIENT
Start: 2021-12-08 | End: 2021-12-08 | Stop reason: HOSPADM

## 2021-12-08 RX ADMIN — SODIUM CHLORIDE 75 ML/HR: 9 INJECTION, SOLUTION INTRAVENOUS at 11:01

## 2021-12-08 RX ADMIN — OXYCODONE HYDROCHLORIDE 5 MG: 5 TABLET ORAL at 17:02

## 2021-12-08 NOTE — DISCHARGE INSTRUCTIONS
[facility]  Cardiology  [address] [place] [address]  [phone]    Coronary Ablation After Care    Refer to this sheet in the next few weeks. These instructions provide you with information on caring for yourself after your procedure. Your health care provider may also give you more specific instructions. Your treatment has been planned according to current medical practices, but problems sometimes occur. Call your health care provider if you have any problems or questions after your procedure.      What to Expect After the Procedure:  After your procedure, it is typical to have the following sensations:  · Minor discomfort or tenderness and a small bump at the catheter insertion site(s). The bump(s) should usually decrease in size and tenderness within 1 to 2 weeks.  · Any bruising will usually fade within 2 to 4 weeks.  Home Care Instructions:  · Do not apply powder or lotion to the site.  · Do not take baths, swim, or use a hot tub until your health care provider approves and the site is completely healed.  · Do not bend, squat, or lift anything over 20 lb (9 kg) or as directed by your health care provider. However, we recommend lifting nothing heavier than a gallon of milk.    · You may shower 24 hours after the procedure. Remove the bandage (dressing) and gently wash the site with plain soap and water. Gently pat the site dry. You may apply a band aid daily for 2 days if desired.    · Inspect the site at least twice daily.  · Increase your fluid intake for the next 2 days.    · Limit your activity for the first 48 hours.   · Avoid strenuous activity for 1 week or as advised by your physician.    · Follow instructions about when you can drive or return to work as directed by your physician.    · Hold direct pressure over the site when you cough, sneeze, laugh or change positions.  Do this for the next 2 days.    Call Your Doctor If:  · You have drainage (other than a small amount of  blood on the dressing).  · You have chills or a fever > 101.  · You have redness, warmth, swelling (larger than a walnut), or pain at the insertion   · You develop palpitations, chest pain or shortness of breath, feel faint, or pass out.  · You develop pain, discoloration, coldness, numbness, tingling, or severe bruising in the leg that held the catheter.  · You develop bleeding from any other place, such as the bowels.  · You have heavy bleeding from the site.  If this happens, hold pressure on the site and call 911.        Make Sure You:  · Understand these instructions.  · Will watch your condition.  · Will get help right away if you are not doing well or get worse.

## 2021-12-08 NOTE — DISCHARGE SUMMARY
[facility]    DISCHARGE SUMMARY      Patient Name: Patel Nevarez  :1960  61 y.o.    Date of Admit: 2021  Date of Discharge:  2021    Discharge Diagnosis:  Problems Addressed this Visit        Cardiac and Vasculature    * (Principal) Atrial flutter (HCC)    Relevant Orders    EP/CRM Study      Diagnoses       Codes Comments    Atrial flutter, unspecified type (HCC)     ICD-10-CM: I48.92  ICD-9-CM: 427.32           Hospital Course:  Patient was brought to the EP lab where he was found to have typical right atrial flutter and atypical right atrial flutter, both of which were successfully ablated.  There were no periprocedural complications.      Procedures Performed  Procedure(s):  Ablation atrial flutter  3D MAPPING INSYTE EP       Consults     No orders found from 2021 to 2021.          Pertinent Test Results:   Results from last 7 days   Lab Units 21  0927   SODIUM mmol/L 141   POTASSIUM mmol/L 5.1   CHLORIDE mmol/L 104   CO2 mmol/L 26.4   BUN mg/dL 13   CREATININE mg/dL 1.05   CALCIUM mg/dL 10.5   BILIRUBIN mg/dL 0.3   ALK PHOS U/L 122*   ALT (SGPT) U/L 42*   AST (SGOT) U/L 25   GLUCOSE mg/dL 103*         @LABRCNT(bnp)@  Results from last 7 days   Lab Units 21  0927   WBC 10*3/mm3 7.38   HEMOGLOBIN g/dL 16.8   HEMATOCRIT % 49.2   PLATELETS 10*3/mm3 180     Results from last 7 days   Lab Units 21  0927   INR  0.92*               Condition on Discharge: stable    Discharge Medications     Discharge Medications      Changes to Medications      Instructions Start Date   metoprolol tartrate 50 MG tablet  Commonly known as: LOPRESSOR  What changed: how much to take   75 mg, Oral, 2 Times Daily         Continue These Medications      Instructions Start Date   albuterol sulfate  (90 Base) MCG/ACT inhaler  Commonly known as: PROVENTIL HFA;VENTOLIN HFA;PROAIR HFA   2 puffs, Inhalation, Every 4 Hours PRN      ALBUTEROL-IPRATROPIUM IN   Inhalation, MN       amitriptyline 100 MG tablet  Commonly known as: ELAVIL   Oral, Nightly      apixaban 5 MG tablet tablet  Commonly known as: ELIQUIS   5 mg, Oral, 2 Times Daily      aspirin  MG tablet   325 mg, Oral, 2 Times Daily      atorvastatin 40 MG tablet  Commonly known as: LIPITOR   40 mg, Oral, Daily      cyanocobalamin 1000 MCG/ML injection   INJECT 1 ML INTRAMUSCULARLY ONCE A WEEK      cyclobenzaprine 5 MG tablet  Commonly known as: FLEXERIL   5 mg, Oral, 3 Times Daily PRN      diazePAM 5 MG tablet  Commonly known as: VALIUM   5 mg, Oral, Every 12 Hours PRN      furosemide 20 MG tablet  Commonly known as: LASIX   20 mg, Oral, Daily      gabapentin 100 MG capsule  Commonly known as: NEURONTIN   100 mg, Oral, 2 Times Daily      Lopid 600 MG tablet  Generic drug: gemfibrozil   600 mg, Oral, 2 Times Daily Before Meals      losartan 25 MG tablet  Commonly known as: Cozaar   25 mg, Oral, Daily      meclizine 25 MG tablet  Commonly known as: ANTIVERT   25 mg, Oral, 3 Times Daily PRN      metFORMIN  MG 24 hr tablet  Commonly known as: GLUCOPHAGE-XR   750 mg, Oral, Daily With Breakfast & Dinner, 2 tabs PM      Narcan 4 MG/0.1ML nasal spray  Generic drug: naloxone   into the nostril(s) as directed by provider.      nitroglycerin 0.6 MG SL tablet  Commonly known as: NITROSTAT   Place 0.4 mg under the tongue.      oxyCODONE-acetaminophen 5-325 MG per tablet  Commonly known as: PERCOCET   1 tablet, Oral, Every 6 Hours PRN      QUEtiapine 300 MG tablet  Commonly known as: SEROquel   300 mg, Oral, Nightly      SEMAGLUTIDE(0.25 OR 0.5MG/DOS) SC   0.5 mg, Subcutaneous, Weekly      venlafaxine 75 MG tablet  Commonly known as: EFFEXOR   225 mg, Oral, 3 Times Daily             Discharge Diet:     Activity at Discharge:     Discharge disposition: home    Follow-up Appointments  Future Appointments   Date Time Provider Department Center   1/3/2022 10:00 AM Jv Turpin MD [facility]   3/22/2022  2:15 PM Deja  [facility] ARMSTRONG [facility]         Test Results Pending at Discharge       Jv Turpin MD, [facility]    12/08/21  15:09 EST

## 2021-12-09 LAB — QT INTERVAL: 334 MS

## 2022-01-03 ENCOUNTER — OFFICE VISIT (OUTPATIENT)
Dept: CARDIOLOGY | Facility: CLINIC | Age: 62
End: 2022-01-03

## 2022-01-03 VITALS
HEART RATE: 102 BPM | BODY MASS INDEX: 29.7 KG/M2 | WEIGHT: 196 LBS | SYSTOLIC BLOOD PRESSURE: 132 MMHG | HEIGHT: 68 IN | DIASTOLIC BLOOD PRESSURE: 86 MMHG | RESPIRATION RATE: 16 BRPM

## 2022-01-03 DIAGNOSIS — Z98.890 H/O MITRAL VALVE REPAIR: ICD-10-CM

## 2022-01-03 DIAGNOSIS — I10 ESSENTIAL HYPERTENSION: ICD-10-CM

## 2022-01-03 DIAGNOSIS — I48.3 TYPICAL ATRIAL FLUTTER: Primary | ICD-10-CM

## 2022-01-03 PROCEDURE — 99213 OFFICE O/P EST LOW 20 MIN: CPT | Performed by: INTERNAL MEDICINE

## 2022-01-03 PROCEDURE — 93000 ELECTROCARDIOGRAM COMPLETE: CPT | Performed by: INTERNAL MEDICINE

## 2022-01-03 RX ORDER — HYDROCODONE BITARTRATE AND ACETAMINOPHEN 5; 325 MG/1; MG/1
TABLET ORAL EVERY 8 HOURS
COMMUNITY
End: 2022-06-06

## 2022-01-03 RX ORDER — TIOTROPIUM BROMIDE AND OLODATEROL 3.124; 2.736 UG/1; UG/1
2 SPRAY, METERED RESPIRATORY (INHALATION) DAILY
COMMUNITY
Start: 2021-11-17

## 2022-01-03 RX ORDER — DIVALPROEX SODIUM 250 MG/1
TABLET, DELAYED RELEASE ORAL
COMMUNITY
End: 2022-03-22 | Stop reason: ALTCHOICE

## 2022-01-03 RX ORDER — RIZATRIPTAN BENZOATE 10 MG/1
TABLET, ORALLY DISINTEGRATING ORAL AS NEEDED
COMMUNITY

## 2022-01-03 NOTE — PROGRESS NOTES
Subjective:     Encounter Date:2022      Patient ID: Patel Nevarez is a 61 y.o. male.    Chief Complaint:  Chief Complaint   Patient presents with   • Follow-up   • Atrial Fibrillation     S/P ablation 21       HPI:  Mr Nevarez is a 62 yo patient of  who is seen for followup of atrial flutter.  His past medical history is significant for HTN, HLD, DM, prior stroke, COPD and mitral valve repair in 1972 and again in .  For the past several months he has noted a marked decline in his stamina associated with increased exertional dyspnea and fatigue. He has also noted that his pulse rate has been rapid, up into the 140's.  He was seen by Dr. Valladares and an EKG demonstrated atrial flutter with 2:1 AV conduction.  He was placed on apixaban and metoprolol.  His dose of metoprolol was titrated up but he has continued to be tachycardic.     His prior cardiac evaluation has included an echo  showed an EF of 30% with a mitral ring and moderate MS and mild TR.  A cath  which showed no significant CAD and an EF of 30-35%.     He was taken to the EP lab  and had the followin. Typical cavotricuspid isthmus dependent atrial flutter  2. Successful ablation of the cavotricuspid isthmus with demonstration of bidirectional conduction block  3. Atypical right atrial flutter  4. Successful ablation of the atypical right atrial flutter  5. Resting sinus tachycardia with normal sinus node recovery time  6. Normal AV node function  7. Mildly prolonged His Purkinje conduction    Since his ablation, he has been feeling better with improved stamina.  He is not having any palpitations, lightheadedness or angina.      The following portions of the patient's history were reviewed and updated as appropriate: allergies, current medications, past family history, past medical history, past social history, past surgical history and problem list.    Problem List:  Patient Active Problem List    Diagnosis   • Thoracic aortic ectasia (HCC)   • Essential hypertension   • DM type 2 (diabetes mellitus, type 2) ([facility])   • Mixed hyperlipidemia   • COPD (chronic obstructive pulmonary disease) ([facility])   • Tobacco abuse   • Cardiomyopathy (HCC)   • Atrial flutter (HCC)   • H/O mitral valve repair       Active Med List:    Current Outpatient Medications:   •  albuterol sulfate  (90 Base) MCG/ACT inhaler, Inhale 2 puffs Every 4 (Four) Hours As Needed for Wheezing., Disp: , Rfl:   •  amitriptyline (ELAVIL) 100 MG tablet, Take  by mouth Every Night., Disp: , Rfl:   •  apixaban (ELIQUIS) 5 MG tablet tablet, Take 1 tablet by mouth 2 (Two) Times a Day., Disp: 60 tablet, Rfl: 3  •  aspirin  MG tablet, Take 325 mg by mouth 2 (Two) Times a Day., Disp: , Rfl:   •  atorvastatin (LIPITOR) 40 MG tablet, Take 40 mg by mouth Daily., Disp: , Rfl:   •  cyanocobalamin 1000 MCG/ML injection, INJECT 1 ML INTRAMUSCULARLY ONCE A WEEK, Disp: , Rfl:   •  cyclobenzaprine (FLEXERIL) 5 MG tablet, Take 5 mg by mouth 3 (Three) Times a Day As Needed for Muscle Spasms., Disp: , Rfl:   •  diazePAM (VALIUM) 5 MG tablet, Take 5 mg by mouth Every 12 (Twelve) Hours As Needed., Disp: , Rfl:   •  furosemide (LASIX) 20 MG tablet, Take 20 mg by mouth Daily., Disp: , Rfl:   •  gabapentin (NEURONTIN) 100 MG capsule, Take 100 mg by mouth 2 (Two) Times a Day., Disp: , Rfl:   •  gemfibrozil (LOPID) 600 MG tablet, Take 600 mg by mouth 2 (Two) Times a Day Before Meals., Disp: , Rfl:   •  HYDROcodone-acetaminophen (NORCO) 5-325 MG per tablet, Every 8 (Eight) Hours., Disp: , Rfl:   •  losartan (Cozaar) 25 MG tablet, Take 1 tablet by mouth Daily., Disp: 90 tablet, Rfl: 2  •  meclizine (ANTIVERT) 25 MG tablet, Take 25 mg by mouth 3 (Three) Times a Day As Needed., Disp: , Rfl:   •  metFORMIN ER (GLUCOPHAGE-XR) 750 MG 24 hr tablet, Take 1,500 mg by mouth Daily Before Supper., Disp: , Rfl:   •  metoprolol tartrate (LOPRESSOR) 50 MG tablet, Take 1.5 tablets  by mouth 2 (Two) Times a Day. (Patient taking differently: Take 100 mg by mouth 2 (Two) Times a Day.), Disp: 270 tablet, Rfl: 1  •  nitroglycerin (NITROSTAT) 0.6 MG SL tablet, Place 0.4 mg under the tongue., Disp: , Rfl:   •  oxyCODONE-acetaminophen (PERCOCET) 5-325 MG per tablet, Take 1 tablet by mouth Every 6 (Six) Hours As Needed., Disp: , Rfl:   •  QUEtiapine (SEROquel) 300 MG tablet, Take 300 mg by mouth Every Night., Disp: , Rfl:   •  rizatriptan MLT (MAXALT-MLT) 10 MG disintegrating tablet, As Needed., Disp: , Rfl:   •  SEMAGLUTIDE,0.25 OR 0.5MG/DOS, SC, Inject 0.5 mg under the skin into the appropriate area as directed 1 (One) Time Per Week., Disp: , Rfl:   •  Stiolto Respimat 2.5-2.5 MCG/ACT aerosol solution inhaler, Inhale 2 puffs Daily., Disp: , Rfl:   •  venlafaxine (EFFEXOR) 75 MG tablet, Take 225 mg by mouth Daily., Disp: , Rfl:   •  divalproex (Depakote) 250 MG DR tablet, Depakote 250 mg oral tablet,delayed release (DR/EC) take 4 tablets (1000 mg) by oral route 2 times per day   Active, Disp: , Rfl:   •  Ipratropium-Albuterol (ALBUTEROL-IPRATROPIUM IN), Inhale. MN, Disp: , Rfl:   •  naloxone (Narcan) 4 MG/0.1ML nasal spray, into the nostril(s) as directed by provider., Disp: , Rfl:      Past Medical History:  Past Medical History:   Diagnosis Date   • Abnormal ECG    • Aneurysm (HCC)    • Anxiety    • Arrhythmia    • Asthma    • Atrial fibrillation (HCC)    • Cardiomyopathy (HCC)    • Congenital heart disease    • Congestive heart failure (CHF) (HCC)     EF 30% per recent Echo on 08/24/2021   • COPD (chronic obstructive pulmonary disease) (HCC)    • Coronary artery disease    • DDD (degenerative disc disease), cervical    • Diabetes mellitus (HCC)    • Emphysema of lung (HCC)    • Fibromyalgia    • GERD (gastroesophageal reflux disease)    • Heart attack (HCC)    • Heart murmur    • Hyperlipidemia    • Hypertension    • Migraine    • Prolapse of mitral valve    • Severe mitral valve regurgitation      s/p MVR annuloplasty ring placement in 2016 at [facility] in [place], [place]   • Stroke (HCC)    • TIA (transient ischemic attack)    • Tobacco abuse    • Type 2 diabetes mellitus (HCC)        Past Surgical History:  Past Surgical History:   Procedure Laterality Date   • ABLATION OF DYSRHYTHMIC FOCUS  12/02/2021   • APPENDECTOMY     • ASD REPAIR, OSTIUM PRIMUM  1972 1972   • CARDIAC CATHETERIZATION N/A 11/4/2021    Procedure: Left Heart Cath, possible angioplasty;  Surgeon: Satnam Short MD;  Location: [facility] CATH INVASIVE LOCATION;  Service: Cardiovascular;  Laterality: N/A;   • CARDIAC ELECTROPHYSIOLOGY PROCEDURE N/A 12/8/2021    Procedure: Ablation atrial flutter;  Surgeon: Jv Turpin MD;  Location:  [facility] CATH INVASIVE LOCATION;  Service: Cardiovascular;  Laterality: N/A;   • CARDIAC SURGERY  1972.2016    OPEN HEART SURGERY   • ELBOW PROCEDURE     • MITRAL VALVE REPAIR/REPLACEMENT  1972, 2016    annuloplasty ring placement in 2016 at [facility]   • TONSILLECTOMY         Social History:  Social History     Socioeconomic History   • Marital status:    Tobacco Use   • Smoking status: Current Every Day Smoker     Packs/day: 0.25     Years: 40.00     Pack years: 10.00     Start date: 5/11/1979   • Smokeless tobacco: Never Used   • Tobacco comment: 1-3  A DAY   Vaping Use   • Vaping Use: Never used   Substance and Sexual Activity   • Alcohol use: Not Currently     Alcohol/week: 0.0 standard drinks   • Drug use: Never   • Sexual activity: Yes     Partners: Male     Birth control/protection: Condom       Allergies:  Allergies   Allergen Reactions   • Tigan [Trimethobenzamide Hcl] Rash   • Mushroom Nausea And Vomiting       Immunizations:  Immunization History   Administered Date(s) Administered   • COVID-19 (MODERNA) 1st, 2nd, 3rd Dose Only 03/16/2021, 04/13/2021   • COVID-19 (PFIZER) 11/05/2021   • FluLaval/Fluarix/Fluzone >6 09/25/2019   • Influenza, Unspecified 10/09/2020   •  "Pneumococcal Conjugate 13-Valent (PCV13) 09/25/2019   • Pneumococcal Polysaccharide (PPSV23) 10/30/2020   • Shingrix 01/12/2021   • Tdap 08/26/2020          Objective:         Review of Systems   Constitutional: Positive for fatigue.   Respiratory: Negative for shortness of breath.    Cardiovascular: Negative for chest pain, palpitations and leg swelling.   All other systems reviewed and are negative.       /86   Pulse 102   Resp 16   Ht 172.7 cm (68\")   Wt 88.9 kg (196 lb)   BMI 29.80 kg/m²     Constitutional:       General: Not in acute distress.     Appearance: Well-developed.   Eyes:      General: No scleral icterus.     Conjunctiva/sclera: Conjunctivae normal.      Pupils: Pupils are equal, round, and reactive to light.   HENT:      Head: Normocephalic and atraumatic.   Neck:      Thyroid: No thyromegaly.   Pulmonary:      Effort: Pulmonary effort is normal.      Breath sounds: Normal breath sounds.   Cardiovascular:      Tachycardia present. Regular rhythm.   Abdominal:      General: Bowel sounds are normal.      Palpations: Abdomen is soft.   Musculoskeletal: Normal range of motion.      Cervical back: Normal range of motion. Skin:     General: Skin is warm and dry.   Neurological:      Mental Status: Alert and oriented to person, place, and time.         In-Office Procedure(s):    ECG 12 Lead    Date/Time: 1/3/2022 10:00 AM  Performed by: Jv Turpin MD  Authorized by: Jv Turpin MD   Comparison: compared with previous ECG from 12/8/2021  Comparison to previous ECG: Atrial flutter, iLBBB  Rhythm: sinus rhythm  Conduction: non-specific intraventricular conduction delay    Clinical impression: abnormal EKG          ECG 12 Lead    (Results Pending)        ASCVD RIsk Score::  The ASCVD Risk score (Lukas JENNIFER Jr., et al., 2013) failed to calculate for the following reasons:    Cannot find a previous HDL lab    Cannot find a previous total cholesterol lab    Recent " Radiology:          Lab Review:       Recent labs reviewed and interpreted for clinical significance and application          Assessment:          Diagnosis Plan   1. Typical atrial flutter (HCC)     2. H/O mitral valve repair     3. Essential hypertension            Plan:      1. Atrial flutter - found to have both typical and atypical right atrial flutter and both were succesfuly ablated.  No recurrence since ablation, continue apixaban and metoprolol.     2. MV repair with moderate MS - per Dr. Short     3. HTN - controlled     4. HLD - statin and gemfibrozil     5. DM - on metformin     6. H/o stroke - on apixaban and ASA    He will be following up with Dr. Short  RTC prn         Level of Care:                 Jv Turpin MD  01/03/22

## 2022-01-24 RX ORDER — APIXABAN 5 MG/1
TABLET, FILM COATED ORAL
Qty: 180 TABLET | Refills: 3 | Status: SHIPPED | OUTPATIENT
Start: 2022-01-24 | End: 2023-02-06

## 2022-01-31 RX ORDER — METOPROLOL TARTRATE 50 MG/1
TABLET, FILM COATED ORAL
Qty: 270 TABLET | Refills: 2 | Status: SHIPPED | OUTPATIENT
Start: 2022-01-31 | End: 2023-03-28

## 2022-02-14 RX ORDER — LOSARTAN POTASSIUM 25 MG/1
TABLET ORAL
Qty: 90 TABLET | Refills: 2 | Status: SHIPPED | OUTPATIENT
Start: 2022-02-14 | End: 2022-05-13

## 2022-03-22 ENCOUNTER — OFFICE VISIT (OUTPATIENT)
Dept: CARDIOLOGY | Facility: CLINIC | Age: 62
End: 2022-03-22

## 2022-03-22 VITALS
SYSTOLIC BLOOD PRESSURE: 142 MMHG | HEIGHT: 68 IN | WEIGHT: 186 LBS | DIASTOLIC BLOOD PRESSURE: 88 MMHG | BODY MASS INDEX: 28.19 KG/M2 | HEART RATE: 102 BPM

## 2022-03-22 DIAGNOSIS — I42.0 DILATED CARDIOMYOPATHY: ICD-10-CM

## 2022-03-22 DIAGNOSIS — I71.20 THORACIC AORTIC ANEURYSM WITHOUT RUPTURE: ICD-10-CM

## 2022-03-22 DIAGNOSIS — I48.92 ATRIAL FLUTTER, UNSPECIFIED TYPE: Primary | ICD-10-CM

## 2022-03-22 DIAGNOSIS — Z98.890 H/O MITRAL VALVE REPAIR: ICD-10-CM

## 2022-03-22 PROCEDURE — 99214 OFFICE O/P EST MOD 30 MIN: CPT | Performed by: INTERNAL MEDICINE

## 2022-03-22 RX ORDER — BENZTROPINE MESYLATE 1 MG/1
1 TABLET ORAL 2 TIMES DAILY
COMMUNITY

## 2022-03-22 RX ORDER — FUROSEMIDE 20 MG/1
20 TABLET ORAL DAILY
Qty: 90 TABLET | Refills: 3 | Status: SHIPPED | OUTPATIENT
Start: 2022-03-22 | End: 2022-06-06 | Stop reason: SDUPTHER

## 2022-03-22 RX ORDER — SEMAGLUTIDE 1.34 MG/ML
INJECTION, SOLUTION SUBCUTANEOUS
COMMUNITY
Start: 2022-01-04

## 2022-03-22 RX ORDER — ASPIRIN 81 MG/1
81 TABLET ORAL DAILY
COMMUNITY

## 2022-03-30 PROBLEM — I71.20 THORACIC AORTIC ANEURYSM WITHOUT RUPTURE: Status: ACTIVE | Noted: 2020-04-23

## 2022-03-30 NOTE — ASSESSMENT & PLAN NOTE
Size ranges between 4.0 cm to 4.3 cm on various.  CT without contrast at this time will evaluate the size.  Blood pressure is well controlled.

## 2022-03-30 NOTE — ASSESSMENT & PLAN NOTE
He is status post ablation.  Heart rate is 100.  Most recent EKG showed tachycardia without flutter.  Because of ongoing symptoms we will do an echocardiogram to reassess the LV function.  Continue metoprolol at the current dose along with Eliquis for anticoagulation.  Also recommend to decrease the dose of aspirin to 81 mg p.o. daily (currently taking 325 mg twice daily)

## 2022-03-30 NOTE — ASSESSMENT & PLAN NOTE
Dilated cardiomyopathy.  LV ejection fraction 30%, cardiac cath done in December showed normal coronary arteries.  Continue metoprolol and losartan.  We will repeat the echocardiogram now and if still remains on the normal side, will change losartan to Entresto.  We will continue Lasix 20 mg daily as well.  He is clinically not volume overloaded but has fatigue and exertional shortness of breath.

## 2022-03-30 NOTE — PROGRESS NOTES
CARDIOLOGY FOLLOW-UP PROGRESS NOTE        Chief Complaint  Palpitations, Hypertension, Hyperlipidemia, Atrial Fibrillation, and Cardiac Valve Problem    Subjective            Patel Nevarez presents to [facility] CARDIOLOGY  History of Present Illness    Mr Nevarez is here for a 3-month follow-up visit.  He underwent ablation of atrial flutter in late December 2021.  Today patient reports that, he still feels fatigued and tired.  Heart rate is mostly close to 100, but has not noticed higher heart rates recently.  He denies any chest pain, dizziness or syncopal episodes.  He still has occasional palpitations and a somewhat frustrated he expected a dramatic improvement in symptoms with ablation.  He is taking all the medicines as prescribed.         Past History:     1) Congenital heart disease status post mitral valve repair and closure of atrial septal defect in 1972; 2) Severe mitral regurgitation status post redo mitral valve repair with annuloplasty ring placement in 2016 at [facility] in [place]; 3) Recurrent TIAs, on long-term Plavix; 4) Impulse control disorder; 5) Hypertension; 6) Hyperlipidemia; 7) Diabetes mellitus; 8) Chronic obstructive pulmonary disease; 9) Ascending aortic aneurysm, measuring 4.3 cm in diameter.10) atrial flutter, status post ablation of typical and atypical right atrial flutter by Dr. Turpin on 12/8/2021    Medical History:  Past Medical History:   Diagnosis Date   • Aneurysm (HCC)    • Anxiety    • Asthma    • Atrial fibrillation (HCC)    • Cardiomyopathy (HCC)    • Congenital heart disease    • Congestive heart failure (CHF) ([facility])     EF 30% per recent Echo on 08/24/2021   • COPD (chronic obstructive pulmonary disease) (HCC)    • DDD (degenerative disc disease), cervical    • Diabetes mellitus (HCC)    • Emphysema of lung (HCC)    • Fibromyalgia    • GERD (gastroesophageal reflux disease)    • Hyperlipidemia    • Hypertension    • Migraine    • Prolapse  of mitral valve    • Severe mitral valve regurgitation     s/p MVR annuloplasty ring placement in 2016 at [facility] in [place], [place]   • Stroke (HCC)    • TIA (transient ischemic attack)    • Tobacco abuse    • Type 2 diabetes mellitus (HCC)        Surgical History: has a past surgical history that includes Tonsillectomy; Appendectomy; Elbow surgery; ASD repair, ostium primum (1972); Mitral valve repair (1972,  2016); Cardiac surgery (1972.2016); Cardiac catheterization (N/A, 11/4/2021); Cardiac electrophysiology procedure (N/A, 12/8/2021); and Ablation of dysrhythmic focus (12/02/2021).     Family History: family history includes Anemia in his father; Aneurysm in his father; Cancer in his father and mother; Diabetes in his father, mother, and another family member; Heart attack in his father; Heart disease in his father; Hyperlipidemia in his father and mother; Hypertension in his father and mother; Thyroid disease in his mother.     Social History: reports that he has been smoking. He started smoking about 42 years ago. He has a 10.00 pack-year smoking history. He has never used smokeless tobacco. He reports previous alcohol use. He reports that he does not use drugs.    Allergies: Tigan [trimethobenzamide hcl] and Mushroom    Current Outpatient Medications on File Prior to Visit   Medication Sig   • albuterol sulfate  (90 Base) MCG/ACT inhaler Inhale 2 puffs Every 4 (Four) Hours As Needed for Wheezing.   • amitriptyline (ELAVIL) 100 MG tablet Take  by mouth Every Night.   • aspirin 81 MG EC tablet Take 81 mg by mouth Daily.   • atorvastatin (LIPITOR) 40 MG tablet Take 40 mg by mouth Daily.   • benztropine (COGENTIN) 1 MG tablet Take 1 mg by mouth 2 (Two) Times a Day.   • cyanocobalamin 1000 MCG/ML injection INJECT 1 ML INTRAMUSCULARLY ONCE A WEEK   • cyclobenzaprine (FLEXERIL) 5 MG tablet Take 5 mg by mouth 3 (Three) Times a Day As Needed for Muscle Spasms.   • diazePAM (VALIUM) 5 MG tablet Take  "5 mg by mouth Every 12 (Twelve) Hours As Needed.   • Eliquis 5 MG tablet tablet TAKE 1 TABLET BY MOUTH TWICE DAILY   • gabapentin (NEURONTIN) 100 MG capsule Take 100 mg by mouth 2 (Two) Times a Day.   • HYDROcodone-acetaminophen (NORCO) 5-325 MG per tablet Every 8 (Eight) Hours.   • Ipratropium-Albuterol (ALBUTEROL-IPRATROPIUM IN) Inhale. MN   • losartan (COZAAR) 25 MG tablet TAKE 1 TABLET BY MOUTH ONCE DAILY   • meclizine (ANTIVERT) 25 MG tablet Take 25 mg by mouth 3 (Three) Times a Day As Needed.   • metFORMIN ER (GLUCOPHAGE-XR) 750 MG 24 hr tablet Take 1,500 mg by mouth Daily Before Supper.   • metoprolol tartrate (LOPRESSOR) 50 MG tablet TAKE 1 AND 1/2 TABLETS BY MOUTH TWICE DAILY (Patient taking differently: 2 (Two) Times a Day. 2 tab bid)   • naloxone (NARCAN) 4 MG/0.1ML nasal spray into the nostril(s) as directed by provider.   • nitroglycerin (NITROSTAT) 0.6 MG SL tablet Place 0.4 mg under the tongue.   • Ozempic, 0.25 or 0.5 MG/DOSE, 2 MG/1.5ML solution pen-injector    • QUEtiapine (SEROquel) 300 MG tablet Take 300 mg by mouth Every Night.   • rizatriptan MLT (MAXALT-MLT) 10 MG disintegrating tablet As Needed.   • Stiolto Respimat 2.5-2.5 MCG/ACT aerosol solution inhaler Inhale 2 puffs Daily.   • venlafaxine (EFFEXOR) 75 MG tablet Take 225 mg by mouth Daily.     No current facility-administered medications on file prior to visit.          Review of Systems   Constitutional: Positive for fatigue.   Respiratory: Positive for shortness of breath. Negative for cough and wheezing.    Cardiovascular: Positive for palpitations. Negative for chest pain and leg swelling.   Gastrointestinal: Negative for nausea and vomiting.   Neurological: Negative for dizziness and syncope.        Objective     /88 (BP Location: Left arm)   Pulse 102   Ht 172.7 cm (68\")   Wt 84.4 kg (186 lb)   BMI 28.28 kg/m²       Physical Exam    General : Alert, awake, no acute distress  Neck : Supple, no carotid bruit, no jugular " venous distention  CVS : Tachycardic, regular, 2/6 systolic murmur heard left sternal border,no rubs or gallops  Lungs: Clear to auscultation bilaterally, no crackles or rhonchi  Abdomen: Soft, nontender, bowel sounds heard in all 4 quadrants  Extremities: Warm, well-perfused, trace edema bilaterally    Result Review :     The following data was reviewed by: Satnam Short MD on 03/22/2022:    CMP    CMP 11/4/21 12/6/21   Glucose 102 (A) 103 (A)   BUN 14 13   Creatinine 0.99 1.05   eGFR Non African Am 76 71   Sodium 136 141   Potassium 4.6 5.1   Chloride 103 104   Calcium 9.1 10.5   Albumin  5.00   Total Bilirubin  0.3   Alkaline Phosphatase  122 (A)   AST (SGOT)  25   ALT (SGPT)  42 (A)   (A) Abnormal value            CBC    CBC 11/4/21 12/6/21   WBC 7.23 7.38   RBC 5.16 5.51   Hemoglobin 15.8 16.8   Hematocrit 46.6 49.2   MCV 90.3 89.3   MCH 30.6 30.5   MCHC 33.9 34.1   RDW 14.1 12.7   Platelets 142 180                    Data reviewed: Cardiology studies        Results for orders placed in visit on 08/23/21    Adult Transthoracic Echo Complete w/ Color, Spectral and Contrast if necessary per protocol    Interpretation Summary  · Estimated left ventricular EF = 30% Left ventricular systolic function is moderately decreased.  · There is diastolic dysfunction of left ventricle.  · Mitral valve ring and postsurgical changes of the mitral valve noted.  · Moderate mitral valve stenosis is present.  · Left atrial volume is moderately increased.  · Mild dilation of the aortic root is present. Mild dilation of the sinuses of Valsalva is present.  · Estimated right ventricular systolic pressure from tricuspid regurgitation is mildly elevated (35-45 mmHg).      Results for orders placed during the hospital encounter of 09/10/21    Stress Test With Myocardial Perfusion One Day    Interpretation Summary  · Abnormal myocardial SPECT perfusion study.  · Myocardial perfusion imaging indicates a small-sized infarct located in  the apex with no significant ischemia noted.  · There is moderate to severe global hypokinesis of the left ventricle with apical akinesis.  · Findings consistent with an equivocal ECG stress test.               Assessment and Plan        Diagnoses and all orders for this visit:    1. Atrial flutter, unspecified type (HCC) (Primary)  Assessment & Plan:  He is status post ablation.  Heart rate is 100.  Most recent EKG showed tachycardia without flutter.  Because of ongoing symptoms we will do an echocardiogram to reassess the LV function.  Continue metoprolol at the current dose along with Eliquis for anticoagulation.  Also recommend to decrease the dose of aspirin to 81 mg p.o. daily (currently taking 325 mg twice daily)    Orders:  -     Adult Transthoracic Echo Complete W/ Cont if Necessary Per Protocol; Future    2. H/O mitral valve repair  Assessment & Plan:  Repeat echo as mentioned above.      3. Dilated cardiomyopathy (HCC)  Assessment & Plan:  Dilated cardiomyopathy.  LV ejection fraction 30%, cardiac cath done in December showed normal coronary arteries.  Continue metoprolol and losartan.  We will repeat the echocardiogram now and if still remains on the normal side, will change losartan to Entresto.  We will continue Lasix 20 mg daily as well.  He is clinically not volume overloaded but has fatigue and exertional shortness of breath.    Orders:  -     furosemide (LASIX) 20 MG tablet; Take 1 tablet by mouth Daily.  Dispense: 90 tablet; Refill: 3  -     Adult Transthoracic Echo Complete W/ Cont if Necessary Per Protocol; Future    4. Thoracic aortic aneurysm without rupture (HCC)  Assessment & Plan:  Size ranges between 4.0 cm to 4.3 cm on various.  CT without contrast at this time will evaluate the size.  Blood pressure is well controlled.    Orders:  -     CT Chest Without Contrast; Future            Follow Up     Return in about 3 months (around 6/22/2022) for Next scheduled follow up, OKAY TO DOUBLE BOOK  .    Patient was given instructions and counseling regarding his condition or for health maintenance advice. Please see specific information pulled into the AVS if appropriate.

## 2022-04-01 ENCOUNTER — HOSPITAL ENCOUNTER (OUTPATIENT)
Dept: CT IMAGING | Facility: HOSPITAL | Age: 62
Discharge: HOME | End: 2022-04-01
Admitting: INTERNAL MEDICINE

## 2022-04-01 DIAGNOSIS — I71.20 THORACIC AORTIC ANEURYSM WITHOUT RUPTURE: ICD-10-CM

## 2022-04-01 PROCEDURE — 71250 CT THORAX DX C-: CPT

## 2022-05-13 ENCOUNTER — TELEPHONE (OUTPATIENT)
Dept: CARDIOLOGY | Facility: CLINIC | Age: 62
End: 2022-05-13

## 2022-05-13 NOTE — TELEPHONE ENCOUNTER
JD patient regarding results and recommendations. Patient agreeable for Entresto. Patient going to call pharmacy around noon and if medication needs PA patient going to come by office and get samples.

## 2022-05-13 NOTE — TELEPHONE ENCOUNTER
----- Message from Satnam Short MD sent at 5/12/2022  7:44 PM EDT -----  Recent echo shows no significant changes from last year.  Ejection fraction is still 30% which is low.  Status of the mitral valve is the same with the moderate stenosis (patient aware of this from previous echo, he had previous mitral valve repair)    Since ejection fraction still low, recommend to stop losartan.  Instead, we will start Entresto 24/26 mg twice daily, which may help to improve the heart function.  Please send the prescription if the patient is agreeable.    We will discuss further during the follow-up visit in June.      Electronically signed by Satnam Short MD, 05/12/22, 7:44 PM EDT.

## 2022-05-16 NOTE — TELEPHONE ENCOUNTER
Pt called and stated that he could not afford the entresto.  I left samples at the  for pt.    Need to start PA.

## 2022-05-17 ENCOUNTER — TELEPHONE (OUTPATIENT)
Dept: CARDIOLOGY | Facility: CLINIC | Age: 62
End: 2022-05-17

## 2022-05-17 NOTE — TELEPHONE ENCOUNTER
Pt came in office today to  samples.  Pt stated that he would like to be put on something else other than the Entrestro because of the cost.  I informed pt that we could start a patient assistant sheet on him, but the pt stated that he would rather try a different medication.  Please advise.

## 2022-05-17 NOTE — TELEPHONE ENCOUNTER
He can go back to losartan 25 mg daily instead of Entresto.      Electronically signed by Satnam Short MD, 05/17/22, 6:15 PM EDT.

## 2022-05-18 RX ORDER — LOSARTAN POTASSIUM 25 MG/1
25 TABLET ORAL DAILY
Qty: 90 TABLET | Refills: 3 | Status: SHIPPED | OUTPATIENT
Start: 2022-05-18

## 2022-06-06 ENCOUNTER — OFFICE VISIT (OUTPATIENT)
Dept: CARDIOLOGY | Facility: CLINIC | Age: 62
End: 2022-06-06

## 2022-06-06 VITALS
SYSTOLIC BLOOD PRESSURE: 102 MMHG | BODY MASS INDEX: 26.07 KG/M2 | HEIGHT: 68 IN | HEART RATE: 96 BPM | WEIGHT: 172 LBS | DIASTOLIC BLOOD PRESSURE: 79 MMHG

## 2022-06-06 DIAGNOSIS — Z98.890 H/O MITRAL VALVE REPAIR: ICD-10-CM

## 2022-06-06 DIAGNOSIS — I48.3 TYPICAL ATRIAL FLUTTER: Primary | ICD-10-CM

## 2022-06-06 DIAGNOSIS — I10 ESSENTIAL HYPERTENSION: ICD-10-CM

## 2022-06-06 DIAGNOSIS — I42.0 DILATED CARDIOMYOPATHY: ICD-10-CM

## 2022-06-06 DIAGNOSIS — I71.20 THORACIC AORTIC ANEURYSM WITHOUT RUPTURE: ICD-10-CM

## 2022-06-06 PROCEDURE — 99213 OFFICE O/P EST LOW 20 MIN: CPT | Performed by: INTERNAL MEDICINE

## 2022-06-06 RX ORDER — FUROSEMIDE 20 MG/1
20 TABLET ORAL DAILY
Qty: 90 TABLET | Refills: 3 | Status: SHIPPED | OUTPATIENT
Start: 2022-06-06 | End: 2022-12-12 | Stop reason: ALTCHOICE

## 2022-06-06 NOTE — ASSESSMENT & PLAN NOTE
Recent echo again showed moderate stenosis of mitral valve with mild aortic insufficiency, unchanged from previous echocardiograms.  He will need annual echocardiograms for follow-up.

## 2022-06-06 NOTE — PROGRESS NOTES
CARDIOLOGY FOLLOW-UP PROGRESS NOTE        Chief Complaint  Hypertension, Cardiomyopathy, Cardiac Valve Problem, Follow-up, and Atrial Fibrillation    Subjective            Patel Nevarez presents to [facility] CARDIOLOGY  History of Present Illness      Mr. Nevarez is here for routine 3-month follow-up visit.  He lost around 20 pounds in the past 3 months.  Today, he denies any new complaints.  Occasionally feels dizziness.  No major palpitations.  Heart rate generally under 100.  Denies any chest pain.  Overall he feels fatigued, weak and tired and reports exertional shortness of breath.  His father recently passed away and he is still recovering from the grief.      Past History:     1) Congenital heart disease status post mitral valve repair and closure of atrial septal defect in 1972; 2) Severe mitral regurgitation status post redo mitral valve repair with annuloplasty ring placement in 2016 at [facility] in [place]; 3) Recurrent TIAs, on long-term Plavix; 4) Impulse control disorder; 5) Hypertension; 6) Hyperlipidemia; 7) Diabetes mellitus; 8) Chronic obstructive pulmonary disease; 9) Ascending aortic aneurysm, measuring 4.3 cm in diameter.10) atrial flutter, status post ablation of typical and atypical right atrial flutter by Dr. Turpin on 12/8/2021    Medical History:  Past Medical History:   Diagnosis Date   • Aneurysm (HCC)    • Anxiety    • Asthma    • Atrial fibrillation (HCC)    • Cardiomyopathy ([facility])    • Congenital heart disease    • Congestive heart failure (CHF) ([facility])     EF 30% per recent Echo on 08/24/2021   • COPD (chronic obstructive pulmonary disease) ([facility])    • DDD (degenerative disc disease), cervical    • Diabetes mellitus (HCC)    • Emphysema of lung (HCC)    • Fibromyalgia    • GERD (gastroesophageal reflux disease)    • Hyperlipidemia    • Hypertension    • Migraine    • Prolapse of mitral valve    • Severe mitral valve regurgitation     s/p MVR annuloplasty  ring placement in 2016 at [facility] in [place], [place]   • Stroke (HCC)    • TIA (transient ischemic attack)    • Tobacco abuse    • Type 2 diabetes mellitus (HCC)        Surgical History: has a past surgical history that includes Tonsillectomy; Appendectomy; Elbow surgery; ASD repair, ostium primum (1972); Mitral valve repair (1972,  2016); Cardiac surgery (1972.2016); Cardiac catheterization (N/A, 11/4/2021); Cardiac electrophysiology procedure (N/A, 12/8/2021); and Ablation of dysrhythmic focus (12/02/2021).     Family History: family history includes Anemia in his father; Aneurysm in his father; Cancer in his father and mother; Diabetes in his father, mother, and another family member; Heart attack in his father; Heart disease in his father; Hyperlipidemia in his father and mother; Hypertension in his father and mother; Thyroid disease in his mother.     Social History: reports that he has been smoking. He started smoking about 43 years ago. He has a 10.00 pack-year smoking history. He has never used smokeless tobacco. He reports previous alcohol use. He reports that he does not use drugs.    Allergies: Tigan [trimethobenzamide hcl] and Mushroom    Current Outpatient Medications on File Prior to Visit   Medication Sig   • albuterol sulfate  (90 Base) MCG/ACT inhaler Inhale 2 puffs Every 4 (Four) Hours As Needed for Wheezing.   • amitriptyline (ELAVIL) 100 MG tablet Take  by mouth Every Night.   • aspirin 81 MG EC tablet Take 81 mg by mouth Daily.   • atorvastatin (LIPITOR) 40 MG tablet Take 40 mg by mouth Daily.   • benztropine (COGENTIN) 1 MG tablet Take 1 mg by mouth 2 (Two) Times a Day.   • cyanocobalamin 1000 MCG/ML injection INJECT 1 ML INTRAMUSCULARLY ONCE A WEEK   • cyclobenzaprine (FLEXERIL) 5 MG tablet Take 5 mg by mouth 3 (Three) Times a Day As Needed for Muscle Spasms.   • diazePAM (VALIUM) 5 MG tablet Take 5 mg by mouth Every 12 (Twelve) Hours As Needed.   • Eliquis 5 MG tablet tablet  "TAKE 1 TABLET BY MOUTH TWICE DAILY   • gabapentin (NEURONTIN) 100 MG capsule Take 100 mg by mouth 2 (Two) Times a Day.   • Ipratropium-Albuterol (ALBUTEROL-IPRATROPIUM IN) Inhale. MN   • losartan (Cozaar) 25 MG tablet Take 1 tablet by mouth Daily.   • meclizine (ANTIVERT) 25 MG tablet Take 25 mg by mouth 3 (Three) Times a Day As Needed.   • metFORMIN ER (GLUCOPHAGE-XR) 750 MG 24 hr tablet Take 1,500 mg by mouth Daily Before Supper.   • metoprolol tartrate (LOPRESSOR) 50 MG tablet TAKE 1 AND 1/2 TABLETS BY MOUTH TWICE DAILY (Patient taking differently: 2 (Two) Times a Day. 2 tab bid)   • naloxone (NARCAN) 4 MG/0.1ML nasal spray into the nostril(s) as directed by provider.   • nitroglycerin (NITROSTAT) 0.6 MG SL tablet Place 0.4 mg under the tongue.   • Ozempic, 0.25 or 0.5 MG/DOSE, 2 MG/1.5ML solution pen-injector    • QUEtiapine (SEROquel) 300 MG tablet Take 300 mg by mouth Every Night.   • rizatriptan MLT (MAXALT-MLT) 10 MG disintegrating tablet As Needed.   • sacubitril-valsartan (ENTRESTO) 24-26 MG tablet Take 1 tablet by mouth 2 (Two) Times a Day.   • Stiolto Respimat 2.5-2.5 MCG/ACT aerosol solution inhaler Inhale 2 puffs Daily.   • venlafaxine (EFFEXOR) 75 MG tablet Take 225 mg by mouth Daily.   • furosemide (LASIX) 20 MG tablet Take 1 tablet by mouth Daily.       Review of Systems   Constitutional: Positive for fatigue.   Respiratory: Negative for cough, shortness of breath and wheezing.    Cardiovascular: Negative for chest pain, palpitations and leg swelling.   Gastrointestinal: Negative for nausea and vomiting.   Neurological: Negative for dizziness and syncope.        Objective     /79   Pulse 96   Ht 172.7 cm (68\")   Wt 78 kg (172 lb)   BMI 26.15 kg/m²       Physical Exam    General : Alert, awake, no acute distress  Neck : Supple, no carotid bruit, no jugular venous distention  CVS : Regular rate and rhythm, systolic murmur heard at the apex, no rubs or gallops  Lungs: Clear to auscultation " bilaterally, no crackles or rhonchi  Abdomen: Soft, nontender, bowel sounds heard in all 4 quadrants  Extremities: Warm, well-perfused, trace edema bilaterally    Result Review :     The following data was reviewed by: Satnam Short MD on 06/06/2022:    CMP    CMP 11/4/21 12/6/21   Glucose 102 (A) 103 (A)   BUN 14 13   Creatinine 0.99 1.05   eGFR Non African Am 76 71   Sodium 136 141   Potassium 4.6 5.1   Chloride 103 104   Calcium 9.1 10.5   Albumin  5.00   Total Bilirubin  0.3   Alkaline Phosphatase  122 (A)   AST (SGOT)  25   ALT (SGPT)  42 (A)   (A) Abnormal value            CBC    CBC 11/4/21 12/6/21   WBC 7.23 7.38   RBC 5.16 5.51   Hemoglobin 15.8 16.8   Hematocrit 46.6 49.2   MCV 90.3 89.3   MCH 30.6 30.5   MCHC 33.9 34.1   RDW 14.1 12.7   Platelets 142 180                    Data reviewed: Cardiology studies        Results for orders placed in visit on 05/10/22    Adult Transthoracic Echo Complete W/ Cont if Necessary Per Protocol    Interpretation Summary  · There is moderate global hypokinesis of the left ventricle with estimated LV ejection fraction of 30%.  · Left ventricular wall thickness is consistent with borderline concentric hypertrophy.  · There is diastolic dysfunction of the left ventricle.  · There is mild left atrial enlargement.  · Mitral annuloplasty ring and postsurgical changes noted. There is moderate stenosis of the mitral valve with mild mitral regurgitation.  · Mild dilation of the ascending aorta is present.  · Findings are grossly unchanged from previous echocardiogram in August, 2021      Results for orders placed during the hospital encounter of 09/10/21    Stress Test With Myocardial Perfusion One Day    Interpretation Summary  · Abnormal myocardial SPECT perfusion study.  · Myocardial perfusion imaging indicates a small-sized infarct located in the apex with no significant ischemia noted.  · There is moderate to severe global hypokinesis of the left ventricle with apical  akinesis.  · Findings consistent with an equivocal ECG stress test.      CT chest done on 4/1/2022 showed    1. Stable fusiform aneurysmal enlargement of the ascending thoracic aorta measuring 4.3 cm in maximum dimension  2. Emphysema  3. Stable small low-density masses within the dome of the right lobe of the liver favored to be cyst or hemangiomas.               Assessment and Plan        Diagnoses and all orders for this visit:    1. Typical atrial flutter (HCC) (Primary)  Assessment & Plan:  In sinus rhythm today.  Continue Eliquis for anticoagulation.  Decrease the dose of metoprolol to tartrate to 50 mg in the morning had to be grams at night due to low blood pressure and dizziness.      2. Dilated cardiomyopathy (HCC)  Assessment & Plan:  Most recent echo again showed LV ejection fraction of 30%.  Heart rate is well controlled and he is status post ablation for atrial flutter.  A prescription for Entresto was given however patient does not want to continue the medication due to cost.  We will continue losartan and metoprolol.  Since he reports fatigue and some dizziness and blood pressure is borderline low, will decrease dose of metoprolol tartrate to 50 mg in the morning and 100 mg at night.  Continue Lasix 20 mg daily.    Orders:  -     furosemide (LASIX) 20 MG tablet; Take 1 tablet by mouth Daily.  Dispense: 90 tablet; Refill: 3    3. H/O mitral valve repair  Assessment & Plan:  Recent echo again showed moderate stenosis of mitral valve with mild aortic insufficiency, unchanged from previous echocardiograms.  He will need annual echocardiograms for follow-up.      4. Essential hypertension  Assessment & Plan:  Reasonably well-controlled, medication changes as mentioned above.      5. Thoracic aortic aneurysm without rupture (HCC)  Assessment & Plan:  CT scan done in April showed stable aneurysm of 4.3 cm diameter.  We will continue to monitor with serial CT scans.  Blood pressure is well  controlled.              Follow Up     Return in about 6 months (around 12/6/2022) for Next scheduled follow up, OK to use a new patient spot .    Patient was given instructions and counseling regarding his condition or for health maintenance advice. Please see specific information pulled into the AVS if appropriate.

## 2022-06-06 NOTE — ASSESSMENT & PLAN NOTE
CT scan done in April showed stable aneurysm of 4.3 cm diameter.  We will continue to monitor with serial CT scans.  Blood pressure is well controlled.

## 2022-06-06 NOTE — ASSESSMENT & PLAN NOTE
In sinus rhythm today.  Continue Eliquis for anticoagulation.  Decrease the dose of metoprolol to tartrate to 50 mg in the morning had to be grams at night due to low blood pressure and dizziness.

## 2022-08-05 ENCOUNTER — LAB (OUTPATIENT)
Dept: LAB | Facility: HOSPITAL | Age: 62
End: 2022-08-05

## 2022-08-05 ENCOUNTER — TRANSCRIBE ORDERS (OUTPATIENT)
Dept: ADMINISTRATIVE | Facility: HOSPITAL | Age: 62
End: 2022-08-05

## 2022-08-05 DIAGNOSIS — Z12.5 SCREENING FOR PROSTATE CANCER: ICD-10-CM

## 2022-08-05 DIAGNOSIS — E78.5 HYPERLIPIDEMIA, UNSPECIFIED HYPERLIPIDEMIA TYPE: Primary | ICD-10-CM

## 2022-08-05 DIAGNOSIS — E78.5 HYPERLIPIDEMIA, UNSPECIFIED HYPERLIPIDEMIA TYPE: ICD-10-CM

## 2022-08-05 LAB
ALBUMIN SERPL-MCNC: 4.4 G/DL (ref 3.5–5.2)
ALBUMIN UR-MCNC: 2.5 MG/DL
ALBUMIN/GLOB SERPL: 2.1 G/DL
ALP SERPL-CCNC: 99 U/L (ref 39–117)
ALT SERPL W P-5'-P-CCNC: 17 U/L (ref 1–41)
ANION GAP SERPL CALCULATED.3IONS-SCNC: 7.6 MMOL/L (ref 5–15)
AST SERPL-CCNC: 16 U/L (ref 1–40)
BASOPHILS # BLD AUTO: 0.08 10*3/MM3 (ref 0–0.2)
BASOPHILS NFR BLD AUTO: 1.1 % (ref 0–1.5)
BILIRUB SERPL-MCNC: 0.3 MG/DL (ref 0–1.2)
BUN SERPL-MCNC: 14 MG/DL (ref 8–23)
BUN/CREAT SERPL: 13.9 (ref 7–25)
CALCIUM SPEC-SCNC: 8.9 MG/DL (ref 8.6–10.5)
CHLORIDE SERPL-SCNC: 109 MMOL/L (ref 98–107)
CHOLEST SERPL-MCNC: 179 MG/DL (ref 0–200)
CO2 SERPL-SCNC: 24.4 MMOL/L (ref 22–29)
CREAT SERPL-MCNC: 1.01 MG/DL (ref 0.76–1.27)
DEPRECATED RDW RBC AUTO: 40.8 FL (ref 37–54)
EGFRCR SERPLBLD CKD-EPI 2021: 83 ML/MIN/1.73
EOSINOPHIL # BLD AUTO: 0.18 10*3/MM3 (ref 0–0.4)
EOSINOPHIL NFR BLD AUTO: 2.4 % (ref 0.3–6.2)
ERYTHROCYTE [DISTWIDTH] IN BLOOD BY AUTOMATED COUNT: 12.5 % (ref 12.3–15.4)
GLOBULIN UR ELPH-MCNC: 2.1 GM/DL
GLUCOSE SERPL-MCNC: 96 MG/DL (ref 65–99)
HBA1C MFR BLD: 5.5 % (ref 4.8–5.6)
HCT VFR BLD AUTO: 43.7 % (ref 37.5–51)
HDLC SERPL-MCNC: 39 MG/DL (ref 40–60)
HGB BLD-MCNC: 14.8 G/DL (ref 13–17.7)
IMM GRANULOCYTES # BLD AUTO: 0.02 10*3/MM3 (ref 0–0.05)
IMM GRANULOCYTES NFR BLD AUTO: 0.3 % (ref 0–0.5)
LDLC SERPL CALC-MCNC: 109 MG/DL (ref 0–100)
LDLC/HDLC SERPL: 2.69 {RATIO}
LYMPHOCYTES # BLD AUTO: 2.51 10*3/MM3 (ref 0.7–3.1)
LYMPHOCYTES NFR BLD AUTO: 33.3 % (ref 19.6–45.3)
MCH RBC QN AUTO: 30.6 PG (ref 26.6–33)
MCHC RBC AUTO-ENTMCNC: 33.9 G/DL (ref 31.5–35.7)
MCV RBC AUTO: 90.3 FL (ref 79–97)
MONOCYTES # BLD AUTO: 0.53 10*3/MM3 (ref 0.1–0.9)
MONOCYTES NFR BLD AUTO: 7 % (ref 5–12)
NEUTROPHILS NFR BLD AUTO: 4.21 10*3/MM3 (ref 1.7–7)
NEUTROPHILS NFR BLD AUTO: 55.9 % (ref 42.7–76)
NRBC BLD AUTO-RTO: 0 /100 WBC (ref 0–0.2)
PLATELET # BLD AUTO: 143 10*3/MM3 (ref 140–450)
PMV BLD AUTO: 11.5 FL (ref 6–12)
POTASSIUM SERPL-SCNC: 5 MMOL/L (ref 3.5–5.2)
PROT SERPL-MCNC: 6.5 G/DL (ref 6–8.5)
PSA SERPL-MCNC: 0.43 NG/ML (ref 0–4)
RBC # BLD AUTO: 4.84 10*6/MM3 (ref 4.14–5.8)
SODIUM SERPL-SCNC: 141 MMOL/L (ref 136–145)
TRIGL SERPL-MCNC: 176 MG/DL (ref 0–150)
TSH SERPL DL<=0.05 MIU/L-ACNC: 1.84 UIU/ML (ref 0.27–4.2)
VLDLC SERPL-MCNC: 31 MG/DL (ref 5–40)
WBC NRBC COR # BLD: 7.53 10*3/MM3 (ref 3.4–10.8)

## 2022-08-05 PROCEDURE — 84443 ASSAY THYROID STIM HORMONE: CPT

## 2022-08-05 PROCEDURE — 80053 COMPREHEN METABOLIC PANEL: CPT

## 2022-08-05 PROCEDURE — 82043 UR ALBUMIN QUANTITATIVE: CPT

## 2022-08-05 PROCEDURE — G0103 PSA SCREENING: HCPCS

## 2022-08-05 PROCEDURE — 36415 COLL VENOUS BLD VENIPUNCTURE: CPT

## 2022-08-05 PROCEDURE — 83036 HEMOGLOBIN GLYCOSYLATED A1C: CPT

## 2022-08-05 PROCEDURE — 80061 LIPID PANEL: CPT

## 2022-08-05 PROCEDURE — 85025 COMPLETE CBC W/AUTO DIFF WBC: CPT

## 2022-12-12 ENCOUNTER — OFFICE VISIT (OUTPATIENT)
Dept: CARDIOLOGY | Facility: CLINIC | Age: 62
End: 2022-12-12

## 2022-12-12 VITALS
DIASTOLIC BLOOD PRESSURE: 100 MMHG | HEART RATE: 107 BPM | BODY MASS INDEX: 26.43 KG/M2 | SYSTOLIC BLOOD PRESSURE: 160 MMHG | WEIGHT: 174.4 LBS | HEIGHT: 68 IN

## 2022-12-12 DIAGNOSIS — I10 ESSENTIAL HYPERTENSION: ICD-10-CM

## 2022-12-12 DIAGNOSIS — I48.3 TYPICAL ATRIAL FLUTTER: ICD-10-CM

## 2022-12-12 DIAGNOSIS — Z98.890 H/O MITRAL VALVE REPAIR: ICD-10-CM

## 2022-12-12 DIAGNOSIS — I71.21 ANEURYSM OF ASCENDING AORTA WITHOUT RUPTURE: ICD-10-CM

## 2022-12-12 DIAGNOSIS — I42.0 DILATED CARDIOMYOPATHY: Primary | ICD-10-CM

## 2022-12-12 PROCEDURE — 99214 OFFICE O/P EST MOD 30 MIN: CPT | Performed by: INTERNAL MEDICINE

## 2022-12-12 NOTE — ASSESSMENT & PLAN NOTE
Blood pressure on the higher side in the office today.  He has not taken any of his morning medications.  He will take it once he reaches home.  Encouraged to keep home blood pressure log.  Continue current regimen without changes.

## 2022-12-12 NOTE — ASSESSMENT & PLAN NOTE
per labs done in August, which is above goal.  He lost weight since then.  He is going to have repeat labs done with next PCP visit.  Continue atorvastatin 40 mg nightly.

## 2022-12-12 NOTE — ASSESSMENT & PLAN NOTE
LVEF 30 to 35% per previous echocardiogram.  Cardiac cath did not show any significant CAD.  He is clinically not volume overloaded.  Currently has no pedal edema.  He is already off Lasix.  We will continue losartan and metoprolol at the current dose.  We will repeat an echocardiogram early next year to reevaluate the LV systolic function.

## 2022-12-12 NOTE — ASSESSMENT & PLAN NOTE
Stable 4.3 cm in diameter per CT scan done last year.  We will continue with adequate blood pressure control.  He will need repeat CT scan next year to reevaluate the size.

## 2022-12-12 NOTE — PROGRESS NOTES
CARDIOLOGY FOLLOW-UP PROGRESS NOTE        Chief Complaint  Atrial Flutter, Cardiomyopathy, and Cardiac Valve Problem (Follow up )    Subjective            Patel Nevarez presents to [facility] CARDIOLOGY  History of Present Illness    Mr Nevarez is here for routine follow-up visit.  Overall he feels fine.  Shortness of breath improved.  He currently has no pedal edema and stopped taking Lasix.  He is currently taking metoprolol 50 mg in the morning 100 mg at night.  Heart rate is generally below 100 at home.  Denies any chest pain.  She reports significant back pain and is currently following up with pain management specialists.      Past History:     1) Congenital heart disease status post mitral valve repair and closure of atrial septal defect in 1972; 2) Severe mitral regurgitation status post redo mitral valve repair with annuloplasty ring placement in 2016 at [facility] in [place]; 3) Recurrent TIAs, on long-term Plavix; 4) Impulse control disorder; 5) Hypertension; 6) Hyperlipidemia; 7) Diabetes mellitus; 8) Chronic obstructive pulmonary disease; 9) Ascending aortic aneurysm, measuring 4.3 cm in diameter.10) atrial flutter, status post ablation of typical and atypical right atrial flutter by Dr. Turpin on 12/8/2021    Medical History:  Past Medical History:   Diagnosis Date   • Aneurysm (HCC)    • Anxiety    • Asthma    • Atrial fibrillation (HCC)    • Cardiomyopathy (HCC)    • Congenital heart disease    • Congestive heart failure (CHF) (HCC)    • COPD (chronic obstructive pulmonary disease) (HCC)    • DDD (degenerative disc disease), cervical    • Diabetes mellitus (HCC)    • Emphysema of lung (HCC)    • Fibromyalgia    • GERD (gastroesophageal reflux disease)    • Hyperlipidemia    • Hypertension    • Migraine    • Prolapse of mitral valve    • Severe mitral valve regurgitation     s/p MVR annuloplasty ring placement in 2016 at [facility] in [place], [place]   • Stroke  (HCC)    • TIA (transient ischemic attack)    • Tobacco abuse    • Type 2 diabetes mellitus (HCC)        Surgical History: has a past surgical history that includes Tonsillectomy; Appendectomy; Elbow surgery; ASD repair, ostium primum (1972); Mitral valve repair (1972,  2016); Cardiac surgery (1972.2016); Cardiac catheterization (N/A, 11/4/2021); Cardiac electrophysiology procedure (N/A, 12/8/2021); and Ablation of dysrhythmic focus (12/02/2021).     Family History: family history includes Anemia in his father; Aneurysm in his father; Cancer in his father and mother; Diabetes in his father, mother, and another family member; Heart attack in his father; Heart disease in his father; Hyperlipidemia in his father and mother; Hypertension in his father and mother; Thyroid disease in his mother.     Social History: reports that he has been smoking cigarettes. He started smoking about 43 years ago. He has a 10.00 pack-year smoking history. He has never used smokeless tobacco. He reports that he does not currently use alcohol. He reports that he does not use drugs.    Allergies: Tigan [trimethobenzamide hcl] and Mushroom    Current Outpatient Medications on File Prior to Visit   Medication Sig   • albuterol sulfate  (90 Base) MCG/ACT inhaler Inhale 2 puffs Every 4 (Four) Hours As Needed for Wheezing.   • amitriptyline (ELAVIL) 100 MG tablet Take  by mouth Every Night.   • aspirin 81 MG EC tablet Take 81 mg by mouth Daily.   • atorvastatin (LIPITOR) 40 MG tablet Take 40 mg by mouth Daily.   • benztropine (COGENTIN) 1 MG tablet Take 1 mg by mouth 2 (Two) Times a Day.   • cyanocobalamin 1000 MCG/ML injection INJECT 1 ML INTRAMUSCULARLY ONCE A WEEK   • cyclobenzaprine (FLEXERIL) 5 MG tablet Take 5 mg by mouth 3 (Three) Times a Day As Needed for Muscle Spasms.   • diazePAM (VALIUM) 5 MG tablet Take 5 mg by mouth Every 12 (Twelve) Hours As Needed.   • Eliquis 5 MG tablet tablet TAKE 1 TABLET BY MOUTH TWICE DAILY   •  "gabapentin (NEURONTIN) 100 MG capsule Take 100 mg by mouth 2 (Two) Times a Day.   • Ipratropium-Albuterol (ALBUTEROL-IPRATROPIUM IN) Inhale. MN   • losartan (Cozaar) 25 MG tablet Take 1 tablet by mouth Daily.   • meclizine (ANTIVERT) 25 MG tablet Take 25 mg by mouth 3 (Three) Times a Day As Needed.   • metFORMIN ER (GLUCOPHAGE-XR) 750 MG 24 hr tablet Take 1,500 mg by mouth Daily Before Supper.   • metoprolol tartrate (LOPRESSOR) 50 MG tablet TAKE 1 AND 1/2 TABLETS BY MOUTH TWICE DAILY (Patient taking differently: 2 (Two) Times a Day. 2 tab bid)   • naloxone (NARCAN) 4 MG/0.1ML nasal spray into the nostril(s) as directed by provider.   • nitroglycerin (NITROSTAT) 0.6 MG SL tablet Place 0.4 mg under the tongue.   • Ozempic, 0.25 or 0.5 MG/DOSE, 2 MG/1.5ML solution pen-injector    • QUEtiapine (SEROquel) 300 MG tablet Take 300 mg by mouth Every Night.   • rizatriptan MLT (MAXALT-MLT) 10 MG disintegrating tablet As Needed.   • Stiolto Respimat 2.5-2.5 MCG/ACT aerosol solution inhaler Inhale 2 puffs Daily.   • venlafaxine (EFFEXOR) 75 MG tablet Take 225 mg by mouth Daily.       Review of Systems   Constitutional: Positive for fatigue.   Respiratory: Negative for cough, shortness of breath and wheezing.    Cardiovascular: Negative for chest pain, palpitations and leg swelling.   Gastrointestinal: Negative for nausea and vomiting.   Musculoskeletal: Positive for back pain.   Neurological: Negative for dizziness and syncope.        Objective     /100   Pulse 107   Ht 172.7 cm (68\")   Wt 79.1 kg (174 lb 6.4 oz)   BMI 26.52 kg/m²       Physical Exam    General : Alert, awake, no acute distress  Neck : Supple, no carotid bruit, no jugular venous distention  CVS : Regular rate and rhythm, no murmur, rubs or gallops  Lungs: Clear to auscultation bilaterally, no crackles or rhonchi  Abdomen: Soft, nontender, bowel sounds heard in all 4 quadrants  Extremities: Warm, well-perfused, no pedal edema    Result Review : "     The following data was reviewed by: Satnam Short MD on 12/12/2022:    CMP    CMP 8/5/22   Glucose 96   BUN 14   Creatinine 1.01   Sodium 141   Potassium 5.0   Chloride 109 (A)   Calcium 8.9   Albumin 4.40   Total Bilirubin 0.3   Alkaline Phosphatase 99   AST (SGOT) 16   ALT (SGPT) 17   (A) Abnormal value            CBC    CBC 8/5/22   WBC 7.53   RBC 4.84   Hemoglobin 14.8   Hematocrit 43.7   MCV 90.3   MCH 30.6   MCHC 33.9   RDW 12.5   Platelets 143           TSH    TSH 8/5/22   TSH 1.840           Lipid Panel    Lipid Panel 8/5/22   Total Cholesterol 179   Triglycerides 176 (A)   HDL Cholesterol 39 (A)   VLDL Cholesterol 31   LDL Cholesterol  109 (A)   LDL/HDL Ratio 2.69   (A) Abnormal value                 Data reviewed: Cardiology studies        Results for orders placed in visit on 05/10/22    Adult Transthoracic Echo Complete W/ Cont if Necessary Per Protocol    Interpretation Summary  · There is moderate global hypokinesis of the left ventricle with estimated LV ejection fraction of 30%.  · Left ventricular wall thickness is consistent with borderline concentric hypertrophy.  · There is diastolic dysfunction of the left ventricle.  · There is mild left atrial enlargement.  · Mitral annuloplasty ring and postsurgical changes noted. There is moderate stenosis of the mitral valve with mild mitral regurgitation.  · Mild dilation of the ascending aorta is present.  · Findings are grossly unchanged from previous echocardiogram in August, 2021      Results for orders placed during the hospital encounter of 09/10/21    Stress Test With Myocardial Perfusion One Day    Interpretation Summary  · Abnormal myocardial SPECT perfusion study.  · Myocardial perfusion imaging indicates a small-sized infarct located in the apex with no significant ischemia noted.  · There is moderate to severe global hypokinesis of the left ventricle with apical akinesis.  · Findings consistent with an equivocal ECG stress  test.                   Assessment and Plan        Diagnoses and all orders for this visit:    1. Dilated cardiomyopathy (HCC) (Primary)  Assessment & Plan:  LVEF 30 to 35% per previous echocardiogram.  Cardiac cath did not show any significant CAD.  He is clinically not volume overloaded.  Currently has no pedal edema.  He is already off Lasix.  We will continue losartan and metoprolol at the current dose.  We will repeat an echocardiogram early next year to reevaluate the LV systolic function.      2. H/O mitral valve repair  Assessment & Plan:  We will repeat echocardiogram XDR to reevaluate the severity of residual mitral stenosis.  Continue aspirin.    Orders:  -     Adult Transthoracic Echo Complete W/ Cont if Necessary Per Protocol; Future    3. Essential hypertension  Assessment & Plan:  Blood pressure on the higher side in the office today.  He has not taken any of his morning medications.  He will take it once he reaches home.  Encouraged to keep home blood pressure log.  Continue current regimen without changes.      4. Typical atrial flutter (HCC)  Assessment & Plan:  Denies any major palpitations.  Heart rate in 100s today.  He has not taken metoprolol this morning.  Encouraged to keep home blood pressure and heart rate log.  Continue metoprolol 50 mg  800 mg at night.  Continue Eliquis for anticoagulation.      5. Aneurysm of ascending aorta without rupture  Assessment & Plan:  Stable 4.3 cm in diameter per CT scan done last year.  We will continue with adequate blood pressure control.  He will need repeat CT scan next year to reevaluate the size.    Orders:  -     CT Chest Without Contrast; Future            Follow Up     Return in about 5 months (around 5/12/2023) for Next scheduled follow up, OK to use a new patient slot. ECHO in April, 2023.    Patient was given instructions and counseling regarding his condition or for health maintenance advice. Please see specific information pulled into the AVS  if appropriate.

## 2022-12-12 NOTE — ASSESSMENT & PLAN NOTE
We will repeat echocardiogram XDR to reevaluate the severity of residual mitral stenosis.  Continue aspirin.

## 2022-12-12 NOTE — ASSESSMENT & PLAN NOTE
Denies any major palpitations.  Heart rate in 100s today.  He has not taken metoprolol this morning.  Encouraged to keep home blood pressure and heart rate log.  Continue metoprolol 50 mg  800 mg at night.  Continue Eliquis for anticoagulation.

## 2022-12-19 ENCOUNTER — HOSPITAL ENCOUNTER (OUTPATIENT)
Dept: GENERAL RADIOLOGY | Facility: HOSPITAL | Age: 62
Discharge: HOME | End: 2022-12-19
Admitting: INTERNAL MEDICINE

## 2022-12-19 ENCOUNTER — TRANSCRIBE ORDERS (OUTPATIENT)
Dept: ADMINISTRATIVE | Facility: HOSPITAL | Age: 62
End: 2022-12-19

## 2022-12-19 DIAGNOSIS — R06.00 DYSPNEA, UNSPECIFIED TYPE: ICD-10-CM

## 2022-12-19 DIAGNOSIS — R06.00 DYSPNEA, UNSPECIFIED TYPE: Primary | ICD-10-CM

## 2022-12-19 PROCEDURE — 71046 X-RAY EXAM CHEST 2 VIEWS: CPT

## 2023-02-06 RX ORDER — APIXABAN 5 MG/1
TABLET, FILM COATED ORAL
Qty: 180 TABLET | Refills: 1 | Status: SHIPPED | OUTPATIENT
Start: 2023-02-06

## 2023-02-07 ENCOUNTER — TRANSCRIBE ORDERS (OUTPATIENT)
Dept: ADMINISTRATIVE | Facility: HOSPITAL | Age: 63
End: 2023-02-07
Payer: MEDICARE

## 2023-02-07 DIAGNOSIS — R56.9 SEIZURES: Primary | ICD-10-CM

## 2023-03-01 ENCOUNTER — TRANSCRIBE ORDERS (OUTPATIENT)
Dept: ADMINISTRATIVE | Facility: HOSPITAL | Age: 63
End: 2023-03-01
Payer: MEDICARE

## 2023-03-01 ENCOUNTER — HOSPITAL ENCOUNTER (OUTPATIENT)
Dept: GENERAL RADIOLOGY | Facility: HOSPITAL | Age: 63
Discharge: HOME | End: 2023-03-01
Admitting: NURSE PRACTITIONER
Payer: MEDICARE

## 2023-03-01 DIAGNOSIS — M25.552 LEFT HIP PAIN: Primary | ICD-10-CM

## 2023-03-01 DIAGNOSIS — M25.552 LEFT HIP PAIN: ICD-10-CM

## 2023-03-01 PROCEDURE — 73502 X-RAY EXAM HIP UNI 2-3 VIEWS: CPT

## 2023-03-07 ENCOUNTER — HOSPITAL ENCOUNTER (OUTPATIENT)
Dept: NEUROLOGY | Facility: HOSPITAL | Age: 63
Discharge: HOME | End: 2023-03-07
Admitting: INTERNAL MEDICINE
Payer: MEDICARE

## 2023-03-07 DIAGNOSIS — R56.9 SEIZURES: ICD-10-CM

## 2023-03-07 PROCEDURE — 95816 EEG AWAKE AND DROWSY: CPT

## 2023-03-28 RX ORDER — METOPROLOL TARTRATE 50 MG/1
TABLET, FILM COATED ORAL
Qty: 270 TABLET | Refills: 2 | Status: SHIPPED | OUTPATIENT
Start: 2023-03-28

## 2023-03-31 ENCOUNTER — HOSPITAL ENCOUNTER (OUTPATIENT)
Dept: CT IMAGING | Facility: HOSPITAL | Age: 63
Discharge: HOME | End: 2023-03-31
Admitting: INTERNAL MEDICINE
Payer: MEDICARE

## 2023-03-31 DIAGNOSIS — I71.21 ANEURYSM OF ASCENDING AORTA WITHOUT RUPTURE: ICD-10-CM

## 2023-03-31 PROCEDURE — 71250 CT THORAX DX C-: CPT

## 2023-04-03 ENCOUNTER — TRANSCRIBE ORDERS (OUTPATIENT)
Dept: ADMINISTRATIVE | Facility: HOSPITAL | Age: 63
End: 2023-04-03
Payer: MEDICARE

## 2023-04-03 DIAGNOSIS — G40.909 SEIZURE DISORDER: Primary | ICD-10-CM

## 2023-04-06 ENCOUNTER — TELEPHONE (OUTPATIENT)
Dept: CARDIOLOGY | Facility: CLINIC | Age: 63
End: 2023-04-06
Payer: MEDICARE

## 2023-04-06 NOTE — TELEPHONE ENCOUNTER
----- Message from Satnam Short MD sent at 4/5/2023  5:32 PM EDT -----  Ejection fraction is 45% and there is some improvement when compared to previous echocardiograms.  The mitral valve has narrowing (stenosis), which is grossly unchanged.  There is no significant residual leaking of the valve.    We will discuss further during follow-up visit in May.      Electronically signed by Satnam Short MD, 04/05/23, 5:32 PM EDT.

## 2023-05-03 ENCOUNTER — HOSPITAL ENCOUNTER (OUTPATIENT)
Dept: MRI IMAGING | Facility: HOSPITAL | Age: 63
Discharge: HOME | End: 2023-05-03
Admitting: NURSE PRACTITIONER
Payer: MEDICARE

## 2023-05-03 DIAGNOSIS — G40.909 SEIZURE DISORDER: ICD-10-CM

## 2023-05-03 LAB
CREAT BLDA-MCNC: 0.9 MG/DL
EGFRCR SERPLBLD CKD-EPI 2021: 95.4 ML/MIN/1.73

## 2023-05-03 PROCEDURE — 0 GADOBENATE DIMEGLUMINE 529 MG/ML SOLUTION: Performed by: NURSE PRACTITIONER

## 2023-05-03 PROCEDURE — 70553 MRI BRAIN STEM W/O & W/DYE: CPT

## 2023-05-03 PROCEDURE — A9577 INJ MULTIHANCE: HCPCS | Performed by: NURSE PRACTITIONER

## 2023-05-03 PROCEDURE — 82565 ASSAY OF CREATININE: CPT

## 2023-05-03 RX ADMIN — GADOBENATE DIMEGLUMINE 15 ML: 529 INJECTION, SOLUTION INTRAVENOUS at 11:28

## 2023-05-08 ENCOUNTER — OFFICE VISIT (OUTPATIENT)
Dept: CARDIOLOGY | Facility: CLINIC | Age: 63
End: 2023-05-08
Payer: MEDICARE

## 2023-05-08 VITALS
HEART RATE: 110 BPM | HEIGHT: 68 IN | WEIGHT: 154 LBS | SYSTOLIC BLOOD PRESSURE: 86 MMHG | BODY MASS INDEX: 23.34 KG/M2 | DIASTOLIC BLOOD PRESSURE: 62 MMHG

## 2023-05-08 DIAGNOSIS — I42.0 DILATED CARDIOMYOPATHY: Primary | ICD-10-CM

## 2023-05-08 DIAGNOSIS — I71.21 ANEURYSM OF ASCENDING AORTA WITHOUT RUPTURE: ICD-10-CM

## 2023-05-08 DIAGNOSIS — Z98.890 H/O MITRAL VALVE REPAIR: ICD-10-CM

## 2023-05-08 DIAGNOSIS — I48.3 TYPICAL ATRIAL FLUTTER: ICD-10-CM

## 2023-05-08 DIAGNOSIS — I10 ESSENTIAL HYPERTENSION: ICD-10-CM

## 2023-05-08 PROCEDURE — 99214 OFFICE O/P EST MOD 30 MIN: CPT | Performed by: INTERNAL MEDICINE

## 2023-05-08 PROCEDURE — 1159F MED LIST DOCD IN RCRD: CPT | Performed by: INTERNAL MEDICINE

## 2023-05-08 PROCEDURE — 3074F SYST BP LT 130 MM HG: CPT | Performed by: INTERNAL MEDICINE

## 2023-05-08 PROCEDURE — 3078F DIAST BP <80 MM HG: CPT | Performed by: INTERNAL MEDICINE

## 2023-05-08 PROCEDURE — 1160F RVW MEDS BY RX/DR IN RCRD: CPT | Performed by: INTERNAL MEDICINE

## 2023-05-08 RX ORDER — LORAZEPAM 1 MG/1
1 TABLET ORAL 2 TIMES DAILY
COMMUNITY

## 2023-05-08 RX ORDER — METOPROLOL TARTRATE 50 MG/1
50 TABLET, FILM COATED ORAL 2 TIMES DAILY
Qty: 180 TABLET | Refills: 2 | Status: SHIPPED | OUTPATIENT
Start: 2023-05-08

## 2023-05-08 RX ORDER — LEVETIRACETAM 750 MG/1
750 TABLET ORAL 2 TIMES DAILY
COMMUNITY

## 2023-05-08 RX ORDER — LOSARTAN POTASSIUM 25 MG/1
12.5 TABLET ORAL DAILY
Qty: 45 TABLET | Refills: 3 | Status: SHIPPED | OUTPATIENT
Start: 2023-05-08

## 2023-05-20 NOTE — PROGRESS NOTES
CARDIOLOGY FOLLOW-UP PROGRESS NOTE        Chief Complaint  Follow-up, Cardiomyopathy, and Hypertension    Subjective            Patel Nevarez presents to [facility] CARDIOLOGY  History of Present Illness    Mr Nevarez is here for routine 5-month follow-up visit.  He reports increasing seizure activity and had an inpatient work-up at [place] recently.  He reports feeling tired and fatigued.  Denies any chest pain.  No recent palpitations.  Denies pedal edema or recent weight gain.      Past History:    1) Congenital heart disease status post mitral valve repair and closure of atrial septal defect in 1972; 2) Severe mitral regurgitation status post redo mitral valve repair with annuloplasty ring placement in 2016 at [facility] in [place]; 3) Recurrent TIAs, on long-term Plavix; 4) Impulse control disorder; 5) Hypertension; 6) Hyperlipidemia; 7) Diabetes mellitus; 8) Chronic obstructive pulmonary disease; 9) Ascending aortic aneurysm, measuring 4.3 cm in diameter.10) atrial flutter, status post ablation of typical and atypical right atrial flutter by Dr. Turpin on 12/8/2021    Medical History:  Past Medical History:   Diagnosis Date   • Aneurysm    • Anxiety    • Asthma    • Atrial fibrillation    • Cardiomyopathy    • Congenital heart disease    • Congestive heart failure (CHF)    • COPD (chronic obstructive pulmonary disease)    • DDD (degenerative disc disease), cervical    • Diabetes mellitus    • Emphysema of lung    • Fibromyalgia    • GERD (gastroesophageal reflux disease)    • Hyperlipidemia    • Hypertension    • Migraine    • Prolapse of mitral valve    • Severe mitral valve regurgitation     s/p MVR annuloplasty ring placement in 2016 at [facility] in [place], [place]   • Stroke    • TIA (transient ischemic attack)    • Tobacco abuse    • Type 2 diabetes mellitus        Surgical History: has a past surgical history that includes Tonsillectomy; Appendectomy; Elbow  surgery; ASD repair, ostium primum (1972); Mitral valve repair (1972,  2016); Cardiac surgery (1972.2016); Cardiac catheterization (N/A, 11/4/2021); Cardiac electrophysiology procedure (N/A, 12/8/2021); and Ablation of dysrhythmic focus (12/02/2021).     Family History: family history includes Anemia in his father; Aneurysm in his father; Cancer in his father and mother; Diabetes in his father, mother, and another family member; Heart attack in his father; Heart disease in his father; Hyperlipidemia in his father and mother; Hypertension in his father and mother; Thyroid disease in his mother.     Social History: reports that he has been smoking cigarettes. He started smoking about 44 years ago. He has a 10.00 pack-year smoking history. He has never used smokeless tobacco. He reports that he does not currently use alcohol. He reports that he does not use drugs.    Allergies: Tigan [trimethobenzamide hcl] and Mushroom    Current Outpatient Medications on File Prior to Visit   Medication Sig   • levETIRAcetam (Keppra) 750 MG tablet Take 1 tablet by mouth 2 (Two) Times a Day.   • LORazepam (ATIVAN) 1 MG tablet Take 1 tablet by mouth 2 (Two) Times a Day.   • albuterol sulfate  (90 Base) MCG/ACT inhaler Inhale 2 puffs Every 4 (Four) Hours As Needed for Wheezing.   • amitriptyline (ELAVIL) 100 MG tablet Take  by mouth Every Night.   • aspirin 81 MG EC tablet Take 81 mg by mouth Daily.   • atorvastatin (LIPITOR) 40 MG tablet Take 40 mg by mouth Daily.   • cyanocobalamin 1000 MCG/ML injection INJECT 1 ML INTRAMUSCULARLY ONCE A WEEK   • cyclobenzaprine (FLEXERIL) 5 MG tablet Take 5 mg by mouth 3 (Three) Times a Day As Needed for Muscle Spasms.   • Eliquis 5 MG tablet tablet TAKE 1 TABLET BY MOUTH TWICE DAILY   • gabapentin (NEURONTIN) 100 MG capsule Take 100 mg by mouth 2 (Two) Times a Day.   • Ipratropium-Albuterol (ALBUTEROL-IPRATROPIUM IN) Inhale. MN   • meclizine (ANTIVERT) 25 MG tablet Take 25 mg by mouth 3  "(Three) Times a Day As Needed.   • metFORMIN ER (GLUCOPHAGE-XR) 750 MG 24 hr tablet Take 1,500 mg by mouth Daily Before Supper.   • naloxone (NARCAN) 4 MG/0.1ML nasal spray into the nostril(s) as directed by provider.   • nitroglycerin (NITROSTAT) 0.6 MG SL tablet Place 0.4 mg under the tongue.   • Ozempic, 0.25 or 0.5 MG/DOSE, 2 MG/1.5ML solution pen-injector    • QUEtiapine (SEROquel) 300 MG tablet Take 300 mg by mouth Every Night.   • rizatriptan MLT (MAXALT-MLT) 10 MG disintegrating tablet As Needed.   • Stiolto Respimat 2.5-2.5 MCG/ACT aerosol solution inhaler Inhale 2 puffs Daily.   • venlafaxine (EFFEXOR) 75 MG tablet Take 225 mg by mouth Daily.     No current facility-administered medications on file prior to visit.          Review of Systems   Constitutional: Positive for fatigue.   Respiratory: Negative for cough, shortness of breath and wheezing.    Cardiovascular: Negative for chest pain, palpitations and leg swelling.   Gastrointestinal: Negative for nausea and vomiting.   Neurological: Positive for seizures. Negative for dizziness and syncope.        Objective     BP (!) 86/62   Pulse 110   Ht 172.7 cm (68\")   Wt 69.9 kg (154 lb)   BMI 23.42 kg/m²       Physical Exam    General : Alert, awake, no acute distress  Neck : Supple, no carotid bruit, no jugular venous distention  CVS : Regular rate and rhythm, no murmur, rubs or gallops  Lungs: Clear to auscultation bilaterally, no crackles or rhonchi  Abdomen: Soft, nontender, bowel sounds heard in all 4 quadrants  Extremities: Warm, well-perfused, no pedal edema    Result Review :     The following data was reviewed by: Satnam Short MD on 05/08/2023:    CMP        8/5/2022    09:19 4/28/2023    17:46 5/3/2023    10:51   CMP   Glucose 96       BUN 14       Creatinine 1.01    0.90     EGFR 83.0    95.4     Sodium 141       Potassium 5.0       Chloride 109       Calcium 8.9       Total Protein 6.5   8.0         Albumin 4.40   4.4         Globulin 2.1 "   3.6         Total Bilirubin 0.3   0.4         Alkaline Phosphatase 99   119         AST (SGOT) 16   25         ALT (SGPT) 17   17         Albumin/Globulin Ratio 2.1       BUN/Creatinine Ratio 13.9       Anion Gap 7.6           This result is from an external source.     CBC        8/5/2022    09:19 4/28/2023    17:46   CBC   WBC 7.53   8.89        RBC 4.84   5.18        Hemoglobin 14.8   15.5        Hematocrit 43.7   46.7        MCV 90.3   90.2        MCH 30.6   29.9        MCHC 33.9   33.2        RDW 12.5   12.5        Platelets 143   151            This result is from an external source.     TSH        8/5/2022    09:19   TSH   TSH 1.840       Lipid Panel        8/5/2022    09:19   Lipid Panel   Total Cholesterol 179     Triglycerides 176     HDL Cholesterol 39     VLDL Cholesterol 31     LDL Cholesterol  109     LDL/HDL Ratio 2.69            Data reviewed: Cardiology studies        Results for orders placed in visit on 04/03/23    Adult Transthoracic Echo Complete W/ Cont if Necessary Per Protocol    Interpretation Summary  •  There is mild global hypokinesis of the left ventricle with estimated LV ejection fraction of 45%.  There is paradoxical septal motion consistent with postoperative state.  •  There is diastolic dysfunction of the left ventricle.  •  Left atrium is mildly enlarged.  •  Postsurgical changes and mitral annuloplasty ring noted.  There is moderate stenosis of the mitral valve.  There is no significant residual mitral regurgitation.  •  Mild dilation of the aortic root is present.      Results for orders placed during the hospital encounter of 09/10/21    Stress Test With Myocardial Perfusion One Day    Interpretation Summary  · Abnormal myocardial SPECT perfusion study.  · Myocardial perfusion imaging indicates a small-sized infarct located in the apex with no significant ischemia noted.  · There is moderate to severe global hypokinesis of the left ventricle with apical akinesis.  · Findings  consistent with an equivocal ECG stress test.      CT chest done on 3/31/2023 showed    1. The ascending thoracic aorta has a maximum diameter 4.0 cm which is unchanged.  The thoracic aortic arch and descending thoracic aorta are of normal caliber.  2. Emphysema.  3. Chronic scarring in the right middle and upper lobes.  4. Pectus carinatum.           Assessment and Plan        Diagnoses and all orders for this visit:    1. Dilated cardiomyopathy (Primary)  Assessment & Plan:  LV ejection fraction improved and is currently at 45% per most recent echocardiogram.  He is clinically not volume overloaded but has increasing fatigue and weakness.  Blood pressure on the lower side.  We will cut down the dose of losartan to 12.5 mg.  Continue metoprolol the current dose.  There is no current indication for loop diuretics.  Recent labs showed normal renal function.    Orders:  -     losartan (Cozaar) 25 MG tablet; Take 0.5 tablets by mouth Daily.  Dispense: 45 tablet; Refill: 3  -     metoprolol tartrate (LOPRESSOR) 50 MG tablet; Take 1 tablet by mouth 2 (Two) Times a Day.  Dispense: 180 tablet; Refill: 2    2. H/O mitral valve repair  Assessment & Plan:  Echocardiogram done last month showed no significant residual mitral regurgitation but has moderate stenosis of the mitral valve.  We will continue to monitor closely by serial echocardiograms.      3. Typical atrial flutter  Assessment & Plan:  He is status post ablation.  Heart rate on the higher side.  Continue metoprolol.  Continue Eliquis for anticoagulation.      4. Aneurysm of ascending aorta without rupture  Assessment & Plan:  The size of the aneurysm is unchanged per recent CT scan of the chest, currently reported as 4.0 cm.  Findings discussed with the patient.  We will continue to monitor by serial CT scans.      5. Essential hypertension  Assessment & Plan:  Blood pressure on the lower side in the office today.  He reports increasing fatigue.  We will decrease  the dose of losartan to 12.5 mg daily.  Since the heart rate is on the higher side, will keep metoprolol current dose.  Encouraged to keep home blood pressure log.              Follow Up     Return in about 4 months (around 9/8/2023) for Next scheduled follow up, OK to use a new patient slot .    Patient was given instructions and counseling regarding his condition or for health maintenance advice. Please see specific information pulled into the AVS if appropriate.

## 2023-05-20 NOTE — ASSESSMENT & PLAN NOTE
The size of the aneurysm is unchanged per recent CT scan of the chest, currently reported as 4.0 cm.  Findings discussed with the patient.  We will continue to monitor by serial CT scans.

## 2023-05-20 NOTE — ASSESSMENT & PLAN NOTE
He is status post ablation.  Heart rate on the higher side.  Continue metoprolol.  Continue Eliquis for anticoagulation.

## 2023-05-20 NOTE — ASSESSMENT & PLAN NOTE
LV ejection fraction improved and is currently at 45% per most recent echocardiogram.  He is clinically not volume overloaded but has increasing fatigue and weakness.  Blood pressure on the lower side.  We will cut down the dose of losartan to 12.5 mg.  Continue metoprolol the current dose.  There is no current indication for loop diuretics.  Recent labs showed normal renal function.

## 2023-05-20 NOTE — ASSESSMENT & PLAN NOTE
Echocardiogram done last month showed no significant residual mitral regurgitation but has moderate stenosis of the mitral valve.  We will continue to monitor closely by serial echocardiograms.

## 2023-05-20 NOTE — ASSESSMENT & PLAN NOTE
Blood pressure on the lower side in the office today.  He reports increasing fatigue.  We will decrease the dose of losartan to 12.5 mg daily.  Since the heart rate is on the higher side, will keep metoprolol current dose.  Encouraged to keep home blood pressure log.

## 2023-06-08 ENCOUNTER — TELEPHONE (OUTPATIENT)
Dept: CARDIOLOGY | Facility: CLINIC | Age: 63
End: 2023-06-08

## 2023-06-08 NOTE — TELEPHONE ENCOUNTER
Caller: Patel Nevarez    Relationship: Self    Best call back number: [phone]     What is the best time to reach you: ANYTIME    What was the call regarding: PT IS CALLING TO SEE IF WE COULD GET RECORDS FROM [facility]. THEY ARE SAYING THAT HIS SEIZURES ARE CARDIAC RELATED AND PT WANTS DR. CHAPPELL TO REVIEW THE RESULTS TO SEE IF HE THINKS IT'S CARDIAC RELATED, STATING IT WAS CARDIOGENIC SYNCOPE. PT IS RATHER ANXIOUS ABOUT THIS, SO HE IS HOPING TO TRY TO GET THIS DONE URGENTLY. PLEASE UPDATE PT ON THE STATUS OF THIS OR IF HE NEEDS TO MAKE AN APPT.    Is it okay if the provider responds through MyChart: CALLBACK

## 2023-06-12 DIAGNOSIS — R55 SYNCOPE, UNSPECIFIED SYNCOPE TYPE: Primary | ICD-10-CM

## 2023-06-14 ENCOUNTER — HOSPITAL ENCOUNTER (OUTPATIENT)
Dept: CARDIOLOGY | Facility: HOSPITAL | Age: 63
Discharge: HOME | End: 2023-06-14
Admitting: FAMILY MEDICINE
Payer: MEDICARE

## 2023-06-14 ENCOUNTER — TELEPHONE (OUTPATIENT)
Dept: CARDIOLOGY | Facility: CLINIC | Age: 63
End: 2023-06-14
Payer: MEDICARE

## 2023-06-14 VITALS — HEIGHT: 68 IN | WEIGHT: 165 LBS | BODY MASS INDEX: 25.01 KG/M2

## 2023-06-14 DIAGNOSIS — R55 SYNCOPE, UNSPECIFIED SYNCOPE TYPE: ICD-10-CM

## 2023-06-14 PROCEDURE — 93660 TILT TABLE EVALUATION: CPT | Performed by: INTERNAL MEDICINE

## 2023-06-14 PROCEDURE — 93660 TILT TABLE EVALUATION: CPT

## 2023-06-14 NOTE — DISCHARGE INSTRUCTIONS
Cardiovascular Services Phone Number: [phone]    Normal activities may be resumed after you are released from the hospital.  Normal consumption of foods and liquids may be resumed immediately after the study.  Contact your physician who directed your tilt table study if you experience any symptoms again.  Resume your medications, following your doctor's instructions.  Notify your doctor if you experience your symptoms while taking your medications  Do not stop taking your medication without notifying your doctor.    In the event of an emergency, call 911 or go to your nearest emergency room.

## 2023-06-14 NOTE — TELEPHONE ENCOUNTER
Patient was wanting to know if he needs f/u appointment to be moved up sooner now that his Tilt Table has been complete.   SW patient. Informed patient once the tilt table has been read and resulted, someone will call from our office with results and f/u information. Patient verbalized understanding and appreciation.

## 2023-06-16 ENCOUNTER — TELEPHONE (OUTPATIENT)
Dept: CARDIOLOGY | Facility: CLINIC | Age: 63
End: 2023-06-16
Payer: MEDICARE

## 2023-06-16 NOTE — TELEPHONE ENCOUNTER
Patient called frustrated, patient was encouraged to f/u with cardiology as his seizure activity is due to lack of blood flow to his brain from heart- per patient.   Patient called strongly requesting his appointment to be moved up if possible as he continues to have seizure like activity and needs Dr Short to complete an assessment of his condition.     RN was able to move his f/u appointment with Dr Short

## 2023-07-15 PROBLEM — R42 POSTURAL DIZZINESS WITH PRESYNCOPE: Status: ACTIVE | Noted: 2023-07-15

## 2023-07-15 PROBLEM — R55 POSTURAL DIZZINESS WITH PRESYNCOPE: Status: ACTIVE | Noted: 2023-07-15

## 2023-07-24 RX ORDER — NITROGLYCERIN 0.4 MG/1
TABLET SUBLINGUAL
Qty: 25 TABLET | Refills: 1 | Status: SHIPPED | OUTPATIENT
Start: 2023-07-24

## 2023-08-03 ENCOUNTER — HOSPITAL ENCOUNTER (OUTPATIENT)
Dept: CARDIOLOGY | Facility: HOSPITAL | Age: 63
Discharge: HOME | End: 2023-08-03
Admitting: INTERNAL MEDICINE
Payer: MEDICARE

## 2023-08-03 DIAGNOSIS — R55 SYNCOPE, UNSPECIFIED SYNCOPE TYPE: ICD-10-CM

## 2023-08-03 LAB
BH CV XLRA MEAS LEFT CAROTID BULB EDV: 9 CM/SEC
BH CV XLRA MEAS LEFT CAROTID BULB PSV: 37 CM/SEC
BH CV XLRA MEAS LEFT DIST CCA EDV: -18 CM/SEC
BH CV XLRA MEAS LEFT DIST CCA PSV: -70.8 CM/SEC
BH CV XLRA MEAS LEFT DIST ICA EDV: -33.5 CM/SEC
BH CV XLRA MEAS LEFT DIST ICA PSV: -73.9 CM/SEC
BH CV XLRA MEAS LEFT ICA/CCA RATIO: 1.02
BH CV XLRA MEAS LEFT MID ICA EDV: -32.3 CM/SEC
BH CV XLRA MEAS LEFT MID ICA PSV: -72.1 CM/SEC
BH CV XLRA MEAS LEFT PROX CCA EDV: 32.3 CM/SEC
BH CV XLRA MEAS LEFT PROX CCA PSV: 82 CM/SEC
BH CV XLRA MEAS LEFT PROX ECA EDV: -20.5 CM/SEC
BH CV XLRA MEAS LEFT PROX ECA PSV: -66.5 CM/SEC
BH CV XLRA MEAS LEFT PROX ICA EDV: -25.5 CM/SEC
BH CV XLRA MEAS LEFT PROX ICA PSV: -52.8 CM/SEC
BH CV XLRA MEAS LEFT VERTEBRAL A EDV: 16.2 CM/SEC
BH CV XLRA MEAS LEFT VERTEBRAL A PSV: 39.8 CM/SEC
BH CV XLRA MEAS RIGHT CAROTID BULB EDV: 11 CM/SEC
BH CV XLRA MEAS RIGHT CAROTID BULB PSV: 37 CM/SEC
BH CV XLRA MEAS RIGHT DIST CCA EDV: 22.4 CM/SEC
BH CV XLRA MEAS RIGHT DIST CCA PSV: 72.7 CM/SEC
BH CV XLRA MEAS RIGHT DIST ICA EDV: -30.4 CM/SEC
BH CV XLRA MEAS RIGHT DIST ICA PSV: -72.1 CM/SEC
BH CV XLRA MEAS RIGHT ICA/CCA RATIO: -0.97
BH CV XLRA MEAS RIGHT MID ICA EDV: -31.1 CM/SEC
BH CV XLRA MEAS RIGHT MID ICA PSV: -70.8 CM/SEC
BH CV XLRA MEAS RIGHT PROX CCA EDV: 24.2 CM/SEC
BH CV XLRA MEAS RIGHT PROX CCA PSV: 96.9 CM/SEC
BH CV XLRA MEAS RIGHT PROX ECA EDV: -21.1 CM/SEC
BH CV XLRA MEAS RIGHT PROX ECA PSV: -85.7 CM/SEC
BH CV XLRA MEAS RIGHT PROX ICA EDV: -20.5 CM/SEC
BH CV XLRA MEAS RIGHT PROX ICA PSV: -63.4 CM/SEC
BH CV XLRA MEAS RIGHT VERTEBRAL A EDV: 18 CM/SEC
BH CV XLRA MEAS RIGHT VERTEBRAL A PSV: 43.5 CM/SEC
LEFT ARM BP: NORMAL MMHG

## 2023-08-03 PROCEDURE — 93880 EXTRACRANIAL BILAT STUDY: CPT

## 2023-08-03 RX ORDER — APIXABAN 5 MG/1
TABLET, FILM COATED ORAL
Qty: 180 TABLET | Refills: 3 | Status: SHIPPED | OUTPATIENT
Start: 2023-08-03

## 2023-11-05 NOTE — PROGRESS NOTES
CARDIOLOGY FOLLOW-UP PROGRESS NOTE        Chief Complaint  Follow-up, Cardiomyopathy, and Atrial Flutter    Subjective            Patel Nevarez presents to [facility] CARDIOLOGY  History of Present Illness    Mr. Nevarez is here for routine 4-month follow-up visit.  He denies any new complaints.  The seizure episodes are much better, and he is currently taking Ativan.  He was recently diagnosed with psychogenic seizures.  Continues to report shortness of breath with activity.  Denies any major palpitations or chest pain.  Since last office visit, he underwent a 14-day extended Holter monitor study, which showed episodes of sinus tachycardia with PVCs but no other significant arrhythmias.      Past History:    1) Congenital heart disease status post mitral valve repair and closure of atrial septal defect in 1972; 2) Severe mitral regurgitation status post redo mitral valve repair with annuloplasty ring placement in 2016 at [facility] in [place]; 3) Recurrent TIAs, on long-term Plavix; 4) Impulse control disorder; 5) Hypertension; 6) Hyperlipidemia; 7) Diabetes mellitus; 8) Chronic obstructive pulmonary disease; 9) Ascending aortic aneurysm, measuring 4.3 cm in diameter.10) atrial flutter, status post ablation of typical and atypical right atrial flutter by Dr. Turpin on 12/8/2021     Medical History:  Past Medical History:   Diagnosis Date    Aneurysm     Anxiety     Asthma     Atrial fibrillation     Cardiomyopathy     Congenital heart disease     Congestive heart failure (CHF)     COPD (chronic obstructive pulmonary disease)     DDD (degenerative disc disease), cervical     Diabetes mellitus     Emphysema of lung     Fibromyalgia     GERD (gastroesophageal reflux disease)     Hyperlipidemia     Hypertension     Migraine     Prolapse of mitral valve     Severe mitral valve regurgitation     s/p MVR annuloplasty ring placement in 2016 at [facility] in [place], [place]    Stroke      TIA (transient ischemic attack)     Tobacco abuse     Type 2 diabetes mellitus        Surgical History: has a past surgical history that includes Tonsillectomy; Appendectomy; Elbow surgery; ASD repair, ostium primum (1972); Mitral valve repair (1972,  2016); Cardiac surgery (1972.2016); Cardiac catheterization (N/A, 11/4/2021); Cardiac electrophysiology procedure (N/A, 12/8/2021); and Ablation of dysrhythmic focus (12/02/2021).     Family History: family history includes Anemia in his father; Aneurysm in his father; Cancer in his father and mother; Diabetes in his father, mother, and another family member; Heart attack in his father; Heart disease in his father; Hyperlipidemia in his father and mother; Hypertension in his father and mother; Thyroid disease in his mother.     Social History: reports that he has been smoking cigarettes. He started smoking about 44 years ago. He has a 10.00 pack-year smoking history. He has never used smokeless tobacco. He reports that he does not currently use alcohol. He reports that he does not use drugs.    Allergies: Tigan [trimethobenzamide hcl], Fish-derived products, and Mushroom    Current Outpatient Medications on File Prior to Visit   Medication Sig    albuterol sulfate  (90 Base) MCG/ACT inhaler Inhale 2 puffs Every 4 (Four) Hours As Needed for Wheezing.    amitriptyline (ELAVIL) 100 MG tablet Take  by mouth Every Night.    aspirin 81 MG EC tablet Take 1 tablet by mouth Daily.    atorvastatin (LIPITOR) 40 MG tablet Take 1 tablet by mouth Daily.    Eliquis 5 MG tablet tablet TAKE 1 TABLET BY MOUTH TWICE DAILY    Ipratropium-Albuterol (ALBUTEROL-IPRATROPIUM IN) Inhale. MN    LORazepam (ATIVAN) 1 MG tablet Take 1 tablet by mouth 2 (Two) Times a Day.    losartan (Cozaar) 25 MG tablet Take 0.5 tablets by mouth Daily.    meclizine (ANTIVERT) 25 MG tablet Take 1 tablet by mouth 3 (Three) Times a Day As Needed.    metFORMIN ER (GLUCOPHAGE-XR) 750 MG 24 hr tablet Take 2  "tablets by mouth Daily Before Supper.    methocarbamol (ROBAXIN) 500 MG tablet Take 1 tablet by mouth 3 (Three) Times a Day.    metoprolol tartrate (LOPRESSOR) 50 MG tablet Take 1 tablet by mouth 2 (Two) Times a Day.    naloxone (NARCAN) 4 MG/0.1ML nasal spray into the nostril(s) as directed by provider.    nitroglycerin (NITROSTAT) 0.4 MG SL tablet PLACE 1 TABLET UNDER THE TONGUE EVERY 5 minutes AS NEEDED FOR CHEST PAIN    QUEtiapine (SEROquel) 300 MG tablet Take 1 tablet by mouth Every Night.    rizatriptan MLT (MAXALT-MLT) 10 MG disintegrating tablet As Needed.    Stiolto Respimat 2.5-2.5 MCG/ACT aerosol solution inhaler Inhale 2 puffs Daily.    venlafaxine (EFFEXOR) 75 MG tablet Take 3 tablets by mouth Daily.       Review of Systems   Constitutional:  Positive for fatigue.   Respiratory:  Positive for shortness of breath. Negative for cough and wheezing.    Cardiovascular:  Negative for chest pain, palpitations and leg swelling.   Gastrointestinal:  Negative for nausea and vomiting.   Neurological:  Negative for dizziness and syncope.        Objective     /81   Pulse 99   Ht 172.7 cm (68\")   Wt 83 kg (183 lb)   BMI 27.83 kg/m²       Physical Exam    General : Alert, awake, no acute distress  Neck : Supple, no carotid bruit, no jugular venous distention  CVS : Regular rate and rhythm, no murmur, rubs or gallops  Lungs: Clear to auscultation bilaterally, no crackles or rhonchi  Abdomen: Soft, nontender, bowel sounds heard in all 4 quadrants  Extremities: Warm, well-perfused, no pedal edema    Result Review :     The following data was reviewed by: Satnam Short MD on 11/06/2023:    CMP          4/28/2023    17:46 5/3/2023    10:51   CMP   Creatinine  0.90    EGFR  95.4    Total Protein 8.0        Albumin 4.4        Globulin 3.6        Total Bilirubin 0.4        Alkaline Phosphatase 119        AST (SGOT) 25        ALT (SGPT) 17           Details          This result is from an external source.         "     CBC          4/28/2023    17:46   CBC   WBC 8.89       RBC 5.18       Hemoglobin 15.5       Hematocrit 46.7       MCV 90.2       MCH 29.9       MCHC 33.2       RDW 12.5       Platelets 151          Details          This result is from an external source.                      Data reviewed: Cardiology studies        Results for orders placed in visit on 04/03/23    Adult Transthoracic Echo Complete W/ Cont if Necessary Per Protocol    Interpretation Summary    There is mild global hypokinesis of the left ventricle with estimated LV ejection fraction of 45%.  There is paradoxical septal motion consistent with postoperative state.    There is diastolic dysfunction of the left ventricle.    Left atrium is mildly enlarged.    Postsurgical changes and mitral annuloplasty ring noted.  There is moderate stenosis of the mitral valve.  There is no significant residual mitral regurgitation.    Mild dilation of the aortic root is present.      Results for orders placed during the hospital encounter of 09/10/21    Stress Test With Myocardial Perfusion One Day    Interpretation Summary  · Abnormal myocardial SPECT perfusion study.  · Myocardial perfusion imaging indicates a small-sized infarct located in the apex with no significant ischemia noted.  · There is moderate to severe global hypokinesis of the left ventricle with apical akinesis.  · Findings consistent with an equivocal ECG stress test.    2-week extended Holter monitor study done on 7/3/2023 showed      A relatively benign 14-day extended Holter monitor study.    Underlying rhythm is normal sinus rhythm with an average heart rate of 95 bpm.  29% of the time was spent in sinus tachycardia.    There are 2 occasional PACs and PVCs noted.  Total ectopy burden was less than 1%.    There were 4 short runs of supraventricular ectopy, longest run lasted for 2.7 seconds    There were no arrhythmias.  There were no long pauses.    There were 16 patient triggered events  during the study and some of them correlated with presence of ectopic beats on monitor strips.    CT scan of the chest done on 12/12/2022 showed    1. The ascending thoracic aorta has a maximum diameter 4.0 cm which is unchanged.  The thoracic aortic arch and descending thoracic aorta are of normal caliber.  2. Emphysema.  3. Chronic scarring in the right middle and upper lobes.  4. Pectus carinatum             Assessment and Plan        Diagnoses and all orders for this visit:    1. H/O mitral valve repair (Primary)  Assessment & Plan:  Echocardiogram in April of this year showed moderate stenosis of the valve with no significant residual mitral regurgitation.  We will continue to monitor and repeat echocardiogram again next year.      2. Dilated cardiomyopathy  Assessment & Plan:  He is clinically not volume overloaded.  He does report shortness of breath with activity.  We will continue losartan and metoprolol.      3. Essential hypertension  Assessment & Plan:  Blood pressure well controlled.  Heart rate in 90s.  Recent Holter monitor study did not show any significant arrhythmias.  Metoprolol and losartan will be continued.      4. Atrial flutter, unspecified type  Assessment & Plan:  No arrhythmias detected during recent 2-week extended Holter monitor study.  He underwent ablation 2021.  Continue Eliquis for anticoagulation.  We will review the latest labs once available.      5. Aneurysm of ascending aorta without rupture  Assessment & Plan:  Stable 4 cm size for the past 2 years.  Blood pressure is reasonably well controlled.  He will have a repeat CT scan late 2024.                Follow Up     Return in about 6 months (around 5/6/2024) for Next scheduled follow up.    Patient was given instructions and counseling regarding his condition or for health maintenance advice. Please see specific information pulled into the AVS if appropriate.

## 2023-11-06 ENCOUNTER — OFFICE VISIT (OUTPATIENT)
Dept: CARDIOLOGY | Facility: CLINIC | Age: 63
End: 2023-11-06
Payer: MEDICARE

## 2023-11-06 VITALS
HEIGHT: 68 IN | SYSTOLIC BLOOD PRESSURE: 140 MMHG | BODY MASS INDEX: 27.74 KG/M2 | HEART RATE: 99 BPM | DIASTOLIC BLOOD PRESSURE: 81 MMHG | WEIGHT: 183 LBS

## 2023-11-06 DIAGNOSIS — I48.92 ATRIAL FLUTTER, UNSPECIFIED TYPE: ICD-10-CM

## 2023-11-06 DIAGNOSIS — Z98.890 H/O MITRAL VALVE REPAIR: Primary | ICD-10-CM

## 2023-11-06 DIAGNOSIS — I42.0 DILATED CARDIOMYOPATHY: ICD-10-CM

## 2023-11-06 DIAGNOSIS — I71.21 ANEURYSM OF ASCENDING AORTA WITHOUT RUPTURE: ICD-10-CM

## 2023-11-06 DIAGNOSIS — I10 ESSENTIAL HYPERTENSION: ICD-10-CM

## 2023-11-06 PROCEDURE — 1159F MED LIST DOCD IN RCRD: CPT | Performed by: INTERNAL MEDICINE

## 2023-11-06 PROCEDURE — 3079F DIAST BP 80-89 MM HG: CPT | Performed by: INTERNAL MEDICINE

## 2023-11-06 PROCEDURE — 1160F RVW MEDS BY RX/DR IN RCRD: CPT | Performed by: INTERNAL MEDICINE

## 2023-11-06 PROCEDURE — 3077F SYST BP >= 140 MM HG: CPT | Performed by: INTERNAL MEDICINE

## 2023-11-06 PROCEDURE — 99214 OFFICE O/P EST MOD 30 MIN: CPT | Performed by: INTERNAL MEDICINE

## 2023-11-06 RX ORDER — METHOCARBAMOL 500 MG/1
500 TABLET, FILM COATED ORAL 3 TIMES DAILY
COMMUNITY
Start: 2023-10-13

## 2023-11-06 NOTE — ASSESSMENT & PLAN NOTE
No arrhythmias detected during recent 2-week extended Holter monitor study.  He underwent ablation 2021.  Continue Eliquis for anticoagulation.  We will review the latest labs once available.

## 2023-11-06 NOTE — ASSESSMENT & PLAN NOTE
Call to patient to schedule procedure in IR at [place].  Confirmed no blood thinners.  Patient states he is NOT taking aspirin at this time.     No Known Allergies  Biopsy scheduled for Friday March 10, 2023 @ 1030, patient to arrive in [facility] @ 0830.  Patient will receive sedation, will need a ride home.  Patient will need to go home with a responsible adult. For patient safety, the patient will not be allowed to drive himself home. Patient instructed to make necessary arrangements for transportation with family or friends. A taxi, bus or Uber is not acceptable transportation.   NPO X 8 hours prior to the procedure.  Patient verbalized full understanding of above instructions.  Department number provided for any further questions.  [phone]     Echocardiogram in April of this year showed moderate stenosis of the valve with no significant residual mitral regurgitation.  We will continue to monitor and repeat echocardiogram again next year.

## 2023-11-06 NOTE — ASSESSMENT & PLAN NOTE
He is clinically not volume overloaded.  He does report shortness of breath with activity.  We will continue losartan and metoprolol.

## 2023-11-06 NOTE — ASSESSMENT & PLAN NOTE
Stable 4 cm size for the past 2 years.  Blood pressure is reasonably well controlled.  He will have a repeat CT scan late 2024.

## 2023-11-06 NOTE — ASSESSMENT & PLAN NOTE
Blood pressure well controlled.  Heart rate in 90s.  Recent Holter monitor study did not show any significant arrhythmias.  Metoprolol and losartan will be continued.

## 2024-03-18 ENCOUNTER — TRANSCRIBE ORDERS (OUTPATIENT)
Dept: ADMINISTRATIVE | Facility: HOSPITAL | Age: 64
End: 2024-03-18
Payer: MEDICARE

## 2024-03-18 ENCOUNTER — TRANSCRIBE ORDERS (OUTPATIENT)
Dept: GENERAL RADIOLOGY | Facility: HOSPITAL | Age: 64
End: 2024-03-18
Payer: MEDICARE

## 2024-03-18 ENCOUNTER — HOSPITAL ENCOUNTER (OUTPATIENT)
Dept: GENERAL RADIOLOGY | Facility: HOSPITAL | Age: 64
Discharge: HOME | End: 2024-03-18
Admitting: INTERNAL MEDICINE
Payer: MEDICARE

## 2024-03-18 DIAGNOSIS — N20.0 KIDNEY STONE: ICD-10-CM

## 2024-03-18 DIAGNOSIS — I73.9 PERIPHERAL VASCULAR DISEASE, UNSPECIFIED: Primary | ICD-10-CM

## 2024-03-18 DIAGNOSIS — N20.0 KIDNEY STONE: Primary | ICD-10-CM

## 2024-03-18 PROCEDURE — 74018 RADEX ABDOMEN 1 VIEW: CPT

## 2024-03-19 ENCOUNTER — TRANSCRIBE ORDERS (OUTPATIENT)
Dept: ADMINISTRATIVE | Facility: HOSPITAL | Age: 64
End: 2024-03-19
Payer: MEDICARE

## 2024-03-19 DIAGNOSIS — N20.0 KIDNEY STONE: Primary | ICD-10-CM

## 2024-04-08 ENCOUNTER — HOSPITAL ENCOUNTER (OUTPATIENT)
Dept: CARDIOLOGY | Facility: HOSPITAL | Age: 64
Discharge: HOME | End: 2024-04-08
Admitting: INTERNAL MEDICINE
Payer: MEDICARE

## 2024-04-08 DIAGNOSIS — I73.9 PERIPHERAL VASCULAR DISEASE, UNSPECIFIED: ICD-10-CM

## 2024-04-08 LAB
BH CV LOWER ARTERIAL LEFT ABI RATIO: 1.28
BH CV LOWER ARTERIAL LEFT DORSALIS PEDIS SYS MAX: 162
BH CV LOWER ARTERIAL LEFT GREAT TOE SYS MAX: 130
BH CV LOWER ARTERIAL LEFT LOW THIGH SYS MAX: 157
BH CV LOWER ARTERIAL LEFT POPLITEAL SYS MAX: 152
BH CV LOWER ARTERIAL LEFT POST TIBIAL SYS MAX: 161
BH CV LOWER ARTERIAL LEFT TBI RATIO: 1.02
BH CV LOWER ARTERIAL RIGHT ABI RATIO: 1.22
BH CV LOWER ARTERIAL RIGHT DORSALIS PEDIS SYS MAX: 152
BH CV LOWER ARTERIAL RIGHT GREAT TOE SYS MAX: 130
BH CV LOWER ARTERIAL RIGHT LOW THIGH SYS MAX: 153
BH CV LOWER ARTERIAL RIGHT POPLITEAL SYS MAX: 151
BH CV LOWER ARTERIAL RIGHT POST TIBIAL SYS MAX: 155
BH CV LOWER ARTERIAL RIGHT TBI RATIO: 1.02
UPPER ARTERIAL LEFT ARM BRACHIAL SYS MAX: 127

## 2024-04-08 PROCEDURE — 93923 UPR/LXTR ART STDY 3+ LVLS: CPT | Performed by: SURGERY

## 2024-04-08 PROCEDURE — 93923 UPR/LXTR ART STDY 3+ LVLS: CPT

## 2024-04-16 ENCOUNTER — HOSPITAL ENCOUNTER (OUTPATIENT)
Dept: CT IMAGING | Facility: HOSPITAL | Age: 64
Discharge: HOME | End: 2024-04-16
Admitting: INTERNAL MEDICINE
Payer: MEDICARE

## 2024-04-16 DIAGNOSIS — N20.0 KIDNEY STONE: ICD-10-CM

## 2024-04-16 PROCEDURE — 74176 CT ABD & PELVIS W/O CONTRAST: CPT

## 2024-05-03 ENCOUNTER — TRANSCRIBE ORDERS (OUTPATIENT)
Dept: ADMINISTRATIVE | Facility: HOSPITAL | Age: 64
End: 2024-05-03
Payer: MEDICARE

## 2024-05-03 DIAGNOSIS — R00.0 TACHYCARDIA: Primary | ICD-10-CM

## 2024-06-04 ENCOUNTER — HOSPITAL ENCOUNTER (OUTPATIENT)
Dept: CARDIOLOGY | Facility: HOSPITAL | Age: 64
Discharge: HOME | End: 2024-06-04
Admitting: INTERNAL MEDICINE
Payer: MEDICARE

## 2024-06-04 DIAGNOSIS — R00.0 TACHYCARDIA: ICD-10-CM

## 2024-06-04 PROCEDURE — 93660 TILT TABLE EVALUATION: CPT | Performed by: INTERNAL MEDICINE

## 2024-06-04 PROCEDURE — 93660 TILT TABLE EVALUATION: CPT

## 2024-06-04 RX ORDER — LORAZEPAM 1 MG/1
1 TABLET ORAL 3 TIMES DAILY PRN
COMMUNITY

## 2024-06-04 RX ORDER — OXYCODONE AND ACETAMINOPHEN 7.5; 325 MG/1; MG/1
1 TABLET ORAL EVERY 12 HOURS PRN
COMMUNITY

## 2024-06-04 RX ORDER — OXCARBAZEPINE 150 MG/1
150 TABLET, FILM COATED ORAL 2 TIMES DAILY
COMMUNITY

## 2024-06-04 RX ORDER — BACLOFEN 20 MG/1
20 TABLET ORAL 2 TIMES DAILY PRN
COMMUNITY

## 2024-06-04 NOTE — DISCHARGE INSTRUCTIONS
Cardiovascular Services Phone Number: [phone]    Normal activities may be resumed after you are released from the hospital.  Normal consumption of foods and liquids may be resumed immediately after the study.  Contact your regular physician for further evaluation if your symptoms occur again.  Contact your physician who directed your tilt table study if you experience any symptoms again.  Resume your preventative medications, following your doctor's instructions.  Notify your doctor if you experience your symptoms while taking your medications  Do not stop taking your medication without notifying your doctor.  Medications given during the procedure sometimes cause irritation to the vein where your IV was inserted. If this should happen, apply warm soaks to the area. If this condition persists, contact your doctor.    In the event of an emergency, call 911 or go to your nearest emergency room.

## 2024-06-18 ENCOUNTER — TELEPHONE (OUTPATIENT)
Dept: CARDIOLOGY | Facility: CLINIC | Age: 64
End: 2024-06-18
Payer: MEDICARE

## 2024-06-18 NOTE — TELEPHONE ENCOUNTER
----- Message from Kate VILLANUEVA sent at 6/17/2024  8:51 AM EDT -----  Regarding: FW: Tilt Table Test.  Contact: [phone]    ----- Message -----  From: Patel Nevarez  Sent: 6/15/2024   6:17 AM EDT  To: Kate Pepper RN  Subject: Tilt Table Test.                                 Thank you for your quick response. If you have reviewed it and Dr. Short has read it, why doesn't it show this website?    According to Dr. Campbell's office, they have not received it either.  Thank you,  Patel Nevarez, RN

## 2024-06-18 NOTE — TELEPHONE ENCOUNTER
SW patient. Patient notified the report is as complete as it will be. Patient verbalized understanding and appreciation

## 2024-06-18 NOTE — TELEPHONE ENCOUNTER
JD patient. Patient states the result are not visible for the patient and is asking how will he get a result of the part that was completed?    Please advise

## 2024-06-18 NOTE — TELEPHONE ENCOUNTER
May not be showing up because it was an incomplete test that was unable to complete.  We can fax results to his PCP for what was available.

## 2024-08-01 ENCOUNTER — OFFICE VISIT (OUTPATIENT)
Dept: CARDIOLOGY | Facility: CLINIC | Age: 64
End: 2024-08-01
Payer: MEDICARE

## 2024-08-01 VITALS
WEIGHT: 181 LBS | HEIGHT: 68 IN | DIASTOLIC BLOOD PRESSURE: 63 MMHG | BODY MASS INDEX: 27.43 KG/M2 | HEART RATE: 97 BPM | SYSTOLIC BLOOD PRESSURE: 99 MMHG

## 2024-08-01 DIAGNOSIS — I42.0 DILATED CARDIOMYOPATHY: ICD-10-CM

## 2024-08-01 DIAGNOSIS — I71.21 ANEURYSM OF ASCENDING AORTA WITHOUT RUPTURE: ICD-10-CM

## 2024-08-01 DIAGNOSIS — I48.92 ATRIAL FLUTTER, UNSPECIFIED TYPE: ICD-10-CM

## 2024-08-01 DIAGNOSIS — Z98.890 H/O MITRAL VALVE REPAIR: Primary | ICD-10-CM

## 2024-08-01 RX ORDER — LEVETIRACETAM 500 MG/1
1 TABLET ORAL 2 TIMES DAILY
COMMUNITY

## 2024-08-01 RX ORDER — METOPROLOL TARTRATE 50 MG/1
50 TABLET, FILM COATED ORAL 2 TIMES DAILY
Qty: 180 TABLET | Refills: 3 | Status: SHIPPED | OUTPATIENT
Start: 2024-08-01

## 2024-08-01 RX ORDER — LOSARTAN POTASSIUM 25 MG/1
12.5 TABLET ORAL DAILY
Qty: 45 TABLET | Refills: 3 | Status: SHIPPED | OUTPATIENT
Start: 2024-08-01

## 2024-08-04 NOTE — ASSESSMENT & PLAN NOTE
Echocardiogram done early  last year showed moderate stenosis of the valve with no significant residual regurgitation.  Will repeat echocardiogram now to reevaluate the status of the valve and LV function.

## 2024-08-04 NOTE — ASSESSMENT & PLAN NOTE
Echo done last year showed ejection fraction 45%, improvement from before.  He is euvolemic on physical examination, NYHA class II symptoms.  Entresto is cost prohibitive.  Will continue metoprolol, losartan

## 2024-08-04 NOTE — ASSESSMENT & PLAN NOTE
4 cm diameter per CT scan done more than a year back.  Blood pressure is well-controlled.  Will repeat CT scan without contrast now to reevaluate the size of the aneurysm.

## 2024-08-04 NOTE — PROGRESS NOTES
CARDIOLOGY FOLLOW-UP PROGRESS NOTE        Chief Complaint  Follow-up, Cardiac Valve Problem, Congestive Heart Failure, Hyperlipidemia, and Hypertension    Subjective            Patel Nevarez presents to [facility] CARDIOLOGY  History of Present Illness    Mr Nevarez is here for routine 6-month follow-up visit, accompanied by significant other.  He reports fatigue and also forgetfulness.  Seizure episodes have been less frequent.  No recent episodes of chest pain.  Continues to report palpitations.  No recent hospitalizations.    Past History:    1) Congenital heart disease status post mitral valve repair and closure of atrial septal defect in 1972; 2) Severe mitral regurgitation status post redo mitral valve repair with annuloplasty ring placement in 2016 at [facility] in [place]; 3) Recurrent TIAs, on long-term Plavix; 4) Impulse control disorder; 5) Hypertension; 6) Hyperlipidemia; 7) Diabetes mellitus; 8) Chronic obstructive pulmonary disease; 9) Ascending aortic aneurysm, measuring 4.3 cm in diameter.10) atrial flutter, status post ablation of typical and atypical right atrial flutter by Dr. Turpin on 12/8/2021     Medical History:  Past Medical History:   Diagnosis Date    Aneurysm     Anxiety     Asthma     Atrial fibrillation     Cardiomyopathy     Congenital heart disease     Congestive heart failure (CHF)     COPD (chronic obstructive pulmonary disease)     DDD (degenerative disc disease), cervical     Diabetes mellitus     Emphysema of lung     Fibromyalgia     GERD (gastroesophageal reflux disease)     Hyperlipidemia     Hypertension     Migraine     Prolapse of mitral valve     Severe mitral valve regurgitation     s/p MVR annuloplasty ring placement in 2016 at [facility] in [place], [place]    Stroke     TIA (transient ischemic attack)     Tobacco abuse     Type 2 diabetes mellitus        Surgical History: has a past surgical history that includes Tonsillectomy;  Appendectomy; Elbow surgery; ASD repair, ostium primum (1972); Mitral valve repair (1972,  2016); Cardiac surgery (1972.2016); Cardiac catheterization (N/A, 11/4/2021); Cardiac electrophysiology procedure (N/A, 12/8/2021); and Ablation of dysrhythmic focus (12/02/2021).     Family History: family history includes Anemia in his father; Aneurysm in his father; Cancer in his father and mother; Diabetes in his father, mother, and another family member; Heart attack in his father; Heart disease in his father; Hyperlipidemia in his father and mother; Hypertension in his father and mother; Thyroid disease in his mother.     Social History: reports that he has been smoking cigarettes. He started smoking about 45 years ago. He has a 11.3 pack-year smoking history. He has never used smokeless tobacco. He reports that he does not currently use alcohol. He reports that he does not use drugs.    Allergies: Tigan [trimethobenzamide hcl], Benztropine, Fish-derived products, Shellfish-derived products, and Mushroom    Current Outpatient Medications on File Prior to Visit   Medication Sig    albuterol sulfate  (90 Base) MCG/ACT inhaler Inhale 2 puffs Every 4 (Four) Hours As Needed for Wheezing.    amitriptyline (ELAVIL) 100 MG tablet Take  by mouth Every Night.    aspirin 81 MG EC tablet Take 1 tablet by mouth Daily.    atorvastatin (LIPITOR) 40 MG tablet Take 1 tablet by mouth Daily.    baclofen (LIORESAL) 20 MG tablet Take 1 tablet by mouth 2 (Two) Times a Day As Needed for Muscle Spasms.    Eliquis 5 MG tablet tablet TAKE 1 TABLET BY MOUTH TWICE DAILY    Ipratropium-Albuterol (ALBUTEROL-IPRATROPIUM IN) Inhale. MN    levETIRAcetam (KEPPRA) 500 MG tablet Take 1 tablet by mouth 2 (Two) Times a Day.    LORazepam (ATIVAN) 1 MG tablet Take 1 tablet by mouth 3 (Three) Times a Day As Needed for Seizures.    meclizine (ANTIVERT) 25 MG tablet Take 1 tablet by mouth 3 (Three) Times a Day As Needed.    metFORMIN ER (GLUCOPHAGE-XR) 750  "MG 24 hr tablet Take 2 tablets by mouth Daily Before Supper.    methocarbamol (ROBAXIN) 500 MG tablet Take 1 tablet by mouth 3 (Three) Times a Day.    naloxone (NARCAN) 4 MG/0.1ML nasal spray into the nostril(s) as directed by provider.    nitroglycerin (NITROSTAT) 0.4 MG SL tablet PLACE 1 TABLET UNDER THE TONGUE EVERY 5 minutes AS NEEDED FOR CHEST PAIN    OXcarbazepine (TRILEPTAL) 300 MG tablet Take 1 tablet by mouth 2 (Two) Times a Day.    oxyCODONE-acetaminophen (PERCOCET) 7.5-325 MG per tablet Take 1 tablet by mouth Every 12 (Twelve) Hours As Needed for Moderate Pain.    QUEtiapine (SEROquel) 300 MG tablet Take 1 tablet by mouth Every Night.    rizatriptan MLT (MAXALT-MLT) 10 MG disintegrating tablet As Needed.    Stiolto Respimat 2.5-2.5 MCG/ACT aerosol solution inhaler Inhale 2 puffs Daily.    venlafaxine (EFFEXOR) 75 MG tablet Take 3 tablets by mouth Daily.     No current facility-administered medications on file prior to visit.          Review of Systems   Constitutional:  Positive for fatigue.   Respiratory:  Positive for shortness of breath. Negative for cough and wheezing.    Cardiovascular:  Negative for chest pain, palpitations and leg swelling.   Gastrointestinal:  Negative for nausea and vomiting.   Neurological:  Positive for memory problem. Negative for dizziness and syncope.        Objective     BP 99/63   Pulse 97   Ht 172.7 cm (68\")   Wt 82.1 kg (181 lb)   BMI 27.52 kg/m²       Physical Exam    General : Alert, awake, no acute distress  Neck : Supple, no carotid bruit, no jugular venous distention  CVS : Regular rate and rhythm, systolic murmur heard, no rubs or gallops  Lungs: Clear to auscultation bilaterally, no crackles or rhonchi  Abdomen: Soft, nontender, bowel sounds heard in all 4 quadrants  Extremities: Warm, well-perfused, no pedal edema    Result Review :     The following data was reviewed by: Satnam Short MD on 08/01/2024:                                   Data reviewed: " Cardiology studies        Results for orders placed in visit on 04/03/23    Adult Transthoracic Echo Complete W/ Cont if Necessary Per Protocol    Interpretation Summary    There is mild global hypokinesis of the left ventricle with estimated LV ejection fraction of 45%.  There is paradoxical septal motion consistent with postoperative state.    There is diastolic dysfunction of the left ventricle.    Left atrium is mildly enlarged.    Postsurgical changes and mitral annuloplasty ring noted.  There is moderate stenosis of the mitral valve.  There is no significant residual mitral regurgitation.    Mild dilation of the aortic root is present.      Results for orders placed during the hospital encounter of 09/10/21    Stress Test With Myocardial Perfusion One Day    Interpretation Summary  · Abnormal myocardial SPECT perfusion study.  · Myocardial perfusion imaging indicates a small-sized infarct located in the apex with no significant ischemia noted.  · There is moderate to severe global hypokinesis of the left ventricle with apical akinesis.  · Findings consistent with an equivocal ECG stress test.               Assessment and Plan        Diagnoses and all orders for this visit:    1. H/O mitral valve repair (Primary)  Assessment & Plan:  Echocardiogram done early  last year showed moderate stenosis of the valve with no significant residual regurgitation.  Will repeat echocardiogram now to reevaluate the status of the valve and LV function.    Orders:  -     Adult Transthoracic Echo Complete W/ Cont if Necessary Per Protocol; Future    2. Dilated cardiomyopathy  Assessment & Plan:  Echo done last year showed ejection fraction 45%, improvement from before.  He is euvolemic on physical examination, NYHA class II symptoms.  Entresto is cost prohibitive.  Will continue metoprolol, losartan    Orders:  -     losartan (Cozaar) 25 MG tablet; Take 0.5 tablets by mouth Daily.  Dispense: 45 tablet; Refill: 3  -     metoprolol  tartrate (LOPRESSOR) 50 MG tablet; Take 1 tablet by mouth 2 (Two) Times a Day.  Dispense: 180 tablet; Refill: 3  -     Adult Transthoracic Echo Complete W/ Cont if Necessary Per Protocol; Future    3. Aneurysm of ascending aorta without rupture  Assessment & Plan:  4 cm diameter per CT scan done more than a year back.  Blood pressure is well-controlled.  Will repeat CT scan without contrast now to reevaluate the size of the aneurysm.    Orders:  -     CT Chest Without Contrast; Future    4. Atrial flutter, unspecified type  Assessment & Plan:  Heart rate is well-controlled, denies any significant palpitations.  Continue Eliquis for anticoagulation along with metoprolol for rate and rhythm management.  Hemoglobin and renal function stable per recent labs.                Follow Up     Return in about 6 months (around 2/1/2025) for Next scheduled follow up.    Patient was given instructions and counseling regarding his condition or for health maintenance advice. Please see specific information pulled into the AVS if appropriate.

## 2024-08-04 NOTE — ASSESSMENT & PLAN NOTE
Heart rate is well-controlled, denies any significant palpitations.  Continue Eliquis for anticoagulation along with metoprolol for rate and rhythm management.  Hemoglobin and renal function stable per recent labs.

## 2024-09-11 ENCOUNTER — HOSPITAL ENCOUNTER (OUTPATIENT)
Dept: CT IMAGING | Facility: HOSPITAL | Age: 64
Discharge: HOME | End: 2024-09-11
Payer: MEDICARE

## 2024-09-11 DIAGNOSIS — I71.21 ANEURYSM OF ASCENDING AORTA WITHOUT RUPTURE: ICD-10-CM

## 2024-09-11 PROCEDURE — 71250 CT THORAX DX C-: CPT

## 2024-09-13 ENCOUNTER — TELEPHONE (OUTPATIENT)
Dept: CARDIOLOGY | Facility: CLINIC | Age: 64
End: 2024-09-13
Payer: MEDICARE

## 2024-09-13 NOTE — TELEPHONE ENCOUNTER
JD patient. Went over results and recommendations. Patient mentioned he went for his ECHO but he was waiting for over an hour and was unable to stay to complete. Patient was provided the schedulers number to reschedule.     Faxed CT results to PCP office.

## 2024-09-13 NOTE — TELEPHONE ENCOUNTER
----- Message from Satnam Short sent at 9/13/2024 11:06 AM EDT -----  CT scan showed size of ascending aorta is 4 cm, which is mildly dilated and mostly unchanged from previous studies done in 2021 and 2022.  Will continue with monitoring and repeat study again in next 1 to 2 years.    Incidentally, a new 4 mm right upper lobe lung nodule noted.  This will need a follow-up as well.  Can you please forward the CT scan reports to patient's primary care provider ?       Electronically signed by Satnam Short MD, 09/13/24, 11:06 AM EDT.

## 2024-09-13 NOTE — PROGRESS NOTES
CT scan showed size of ascending aorta is 4 cm, which is mildly dilated and mostly unchanged from previous studies done in 2021 and 2022.  Will continue with monitoring and repeat study again in next 1 to 2 years.    Incidentally, a new 4 mm right upper lobe lung nodule noted.  This will need a follow-up as well.  Can you please forward the CT scan reports to patient's primary care provider ?       Electronically signed by Satnam Short MD, 09/13/24, 11:06 AM EDT.

## 2024-11-19 DIAGNOSIS — I42.0 DILATED CARDIOMYOPATHY: ICD-10-CM

## 2024-11-19 RX ORDER — NITROGLYCERIN 0.4 MG/1
0.4 TABLET SUBLINGUAL
Qty: 25 TABLET | Refills: 1 | Status: SHIPPED | OUTPATIENT
Start: 2024-11-19

## 2024-11-19 RX ORDER — LOSARTAN POTASSIUM 25 MG/1
12.5 TABLET ORAL DAILY
Qty: 45 TABLET | Refills: 3 | Status: SHIPPED | OUTPATIENT
Start: 2024-11-19

## 2024-11-19 RX ORDER — METOPROLOL TARTRATE 50 MG
50 TABLET ORAL 2 TIMES DAILY
Qty: 180 TABLET | Refills: 3 | Status: SHIPPED | OUTPATIENT
Start: 2024-11-19

## 2024-12-06 RX ORDER — NITROGLYCERIN 0.4 MG/1
TABLET SUBLINGUAL
Qty: 25 TABLET | Refills: 1 | OUTPATIENT
Start: 2024-12-06

## 2025-01-08 ENCOUNTER — TELEPHONE (OUTPATIENT)
Dept: CARDIOLOGY | Facility: CLINIC | Age: 65
End: 2025-01-08
Payer: MEDICARE

## 2025-01-08 NOTE — TELEPHONE ENCOUNTER
Patient's spouse, confirmed spouse is on verbal release. Called to notify our office patient passed away on 12/30/24 due to seizure complication. If information needed PCP can confirm per patient spouse.

## 2025-01-08 NOTE — TELEPHONE ENCOUNTER
Spoke to Dr. Maddox's office and confirmed that this patient did pass away on 24. Changed patient's status to  in Epic.

## (undated) DEVICE — Device

## (undated) DEVICE — GW INQWIRE FC PTFE STD J/1.5 .035 260

## (undated) DEVICE — CATH ABL IRR BIDIR TACTICATH/SE DF/CRV 8F 2-2-2MM 3.5MM 115

## (undated) DEVICE — SOL BETADINE 4OZ

## (undated) DEVICE — ELECTRD PAD DEFIB R/T W/LD PHYSIO/CONTROL A/ 1PR

## (undated) DEVICE — CATH 4F INF PIG 145Â° 110 CM: Brand: INFINITI

## (undated) DEVICE — INTRO HEMO FASTCATH CATH/LOCK 7F 12CM

## (undated) DEVICE — CONTRST ISOVUE370 76PCT 100ML

## (undated) DEVICE — GUIDEWIRE 38/150/MC/PTFE/3J: Brand: GUIDEWIRE

## (undated) DEVICE — CATH F4 INF JL 5 100CM: Brand: INFINITI

## (undated) DEVICE — MODEL AT P65, P/N 701554-001KIT CONTENTS: HAND CONTROLLER, 3-WAY HIGH-PRESSURE STOPCOCK WITH ROTATING END AND PREMIUM HIGH-PRESSURE TUBING: Brand: ANGIOTOUCH® KIT

## (undated) DEVICE — CATH ADVISOR HD GRID MAP SENSR ENABLED

## (undated) DEVICE — RADIFOCUS GLIDEWIRE: Brand: GLIDEWIRE

## (undated) DEVICE — BRACHIAL/AXILLARY/CEREBRAL DRAPE 38 1/2" X 60": Brand: BRACHIAL/AXILLARY/CEREBRAL DRAPE

## (undated) DEVICE — CATH EP INQUIRY DECAPLR CRV 6F 2TO5TO2MM 110CM LG

## (undated) DEVICE — LOU EP: Brand: MEDLINE INDUSTRIES, INC.

## (undated) DEVICE — DRSNG SURESITE WNDW 4X4.5

## (undated) DEVICE — CATH EP STEER DUO-DECAPOLAR SUPERLNG 7F 2-5-2MM 95CM

## (undated) DEVICE — ANGIOGRAPHY PACK: Brand: MEDLINE INDUSTRIES, INC.

## (undated) DEVICE — GAUZE,SPONGE,4"X4",16PLY,STRL,LF,10/TRAY: Brand: MEDLINE

## (undated) DEVICE — CVR PROB ULTRASND GLS STRL

## (undated) DEVICE — GLV SURG SENSICARE SLT PF LF 7 STRL

## (undated) DEVICE — BLD CLIP UNIV SURG GRY

## (undated) DEVICE — GLIDESHEATH SLENDER STAINLESS STEEL KIT: Brand: GLIDESHEATH SLENDER

## (undated) DEVICE — AVANTI + 4F STD W/GW: Brand: AVANTI

## (undated) DEVICE — RADIFOCUS OPTITORQUE ANGIOGRAPHIC CATHETER: Brand: OPTITORQUE

## (undated) DEVICE — CATH F4 INF JL 4 100CM: Brand: INFINITI

## (undated) DEVICE — TBG ABL COOL PT 2.6M 100042049

## (undated) DEVICE — INTRO STEER AGILIS NXT MED/CURL 8.5F

## (undated) DEVICE — KT ELECTRD ENSITE PRECISION SURF

## (undated) DEVICE — MODEL BT2000 P/N 700287-012KIT CONTENTS: MANIFOLD WITH SALINE AND CONTRAST PORTS, SALINE TUBING WITH SPIKE AND HAND SYRINGE, TRANSDUCER: Brand: BT2000 AUTOMATED MANIFOLD KIT

## (undated) DEVICE — CONTAINER,SPECIMEN,O.R.STRL,4.5OZ: Brand: MEDLINE

## (undated) DEVICE — PINNACLE INTRODUCER SHEATH: Brand: PINNACLE

## (undated) DEVICE — CANNULA,OXY,ADULT,SUPER SOFT,W/14'TUB,UC: Brand: MEDLINE INDUSTRIES, INC.

## (undated) DEVICE — CATH F4 INF JR 4 100CM: Brand: INFINITI

## (undated) DEVICE — A2000 MULTI-USE SYRINGE KIT, P/N 701277-003KIT CONTENTS: 100ML CONTRAST RESERVOIR AND TUBING WITH CONTRAST SPIKE AND CLAMP: Brand: A2000 MULTI-USE SYRINGE KIT

## (undated) DEVICE — DECANTER: Brand: UNBRANDED

## (undated) DEVICE — GLV SURG SENSICARE SLT PF LF 7.5 STRL

## (undated) DEVICE — CATH ABL SAFIRE BIDIR UNIV LG CRV 7F 8MM 2-5-2MM 110CM

## (undated) DEVICE — SHT AIR TRANSFR COMFRT GLIDE LAT 40X80IN